# Patient Record
Sex: FEMALE | Race: WHITE | NOT HISPANIC OR LATINO | Employment: FULL TIME | URBAN - METROPOLITAN AREA
[De-identification: names, ages, dates, MRNs, and addresses within clinical notes are randomized per-mention and may not be internally consistent; named-entity substitution may affect disease eponyms.]

---

## 2017-01-02 ENCOUNTER — APPOINTMENT (OUTPATIENT)
Dept: RADIOLOGY | Facility: MEDICAL CENTER | Age: 72
End: 2017-01-02
Attending: EMERGENCY MEDICINE
Payer: COMMERCIAL

## 2017-01-02 ENCOUNTER — HOSPITAL ENCOUNTER (EMERGENCY)
Facility: MEDICAL CENTER | Age: 72
End: 2017-01-02
Attending: EMERGENCY MEDICINE
Payer: COMMERCIAL

## 2017-01-02 VITALS
BODY MASS INDEX: 27.95 KG/M2 | TEMPERATURE: 98.2 F | RESPIRATION RATE: 18 BRPM | HEART RATE: 50 BPM | HEIGHT: 62 IN | OXYGEN SATURATION: 94 % | WEIGHT: 151.9 LBS | DIASTOLIC BLOOD PRESSURE: 77 MMHG | SYSTOLIC BLOOD PRESSURE: 154 MMHG

## 2017-01-02 DIAGNOSIS — M62.838 MUSCLE SPASMS OF NECK: ICD-10-CM

## 2017-01-02 PROCEDURE — 70450 CT HEAD/BRAIN W/O DYE: CPT

## 2017-01-02 PROCEDURE — 96374 THER/PROPH/DIAG INJ IV PUSH: CPT

## 2017-01-02 PROCEDURE — 700111 HCHG RX REV CODE 636 W/ 250 OVERRIDE (IP): Performed by: EMERGENCY MEDICINE

## 2017-01-02 PROCEDURE — 96361 HYDRATE IV INFUSION ADD-ON: CPT

## 2017-01-02 PROCEDURE — 700105 HCHG RX REV CODE 258: Performed by: EMERGENCY MEDICINE

## 2017-01-02 PROCEDURE — 99284 EMERGENCY DEPT VISIT MOD MDM: CPT

## 2017-01-02 PROCEDURE — 96375 TX/PRO/DX INJ NEW DRUG ADDON: CPT

## 2017-01-02 PROCEDURE — 72125 CT NECK SPINE W/O DYE: CPT

## 2017-01-02 PROCEDURE — 36415 COLL VENOUS BLD VENIPUNCTURE: CPT

## 2017-01-02 RX ORDER — SODIUM CHLORIDE 9 MG/ML
INJECTION, SOLUTION INTRAVENOUS CONTINUOUS
Status: DISCONTINUED | OUTPATIENT
Start: 2017-01-02 | End: 2017-01-02 | Stop reason: HOSPADM

## 2017-01-02 RX ORDER — LORAZEPAM 0.5 MG/1
0.5 TABLET ORAL EVERY 8 HOURS PRN
Qty: 20 TAB | Refills: 0 | Status: SHIPPED | OUTPATIENT
Start: 2017-01-02 | End: 2017-07-21

## 2017-01-02 RX ORDER — MORPHINE SULFATE 4 MG/ML
4 INJECTION, SOLUTION INTRAMUSCULAR; INTRAVENOUS ONCE
Status: COMPLETED | OUTPATIENT
Start: 2017-01-02 | End: 2017-01-02

## 2017-01-02 RX ORDER — KETOROLAC TROMETHAMINE 30 MG/ML
30 INJECTION, SOLUTION INTRAMUSCULAR; INTRAVENOUS ONCE
Status: COMPLETED | OUTPATIENT
Start: 2017-01-02 | End: 2017-01-02

## 2017-01-02 RX ORDER — OXYCODONE HYDROCHLORIDE AND ACETAMINOPHEN 5; 325 MG/1; MG/1
1 TABLET ORAL EVERY 6 HOURS PRN
Qty: 20 TAB | Refills: 0 | Status: SHIPPED | OUTPATIENT
Start: 2017-01-02 | End: 2017-07-21

## 2017-01-02 RX ORDER — ONDANSETRON 2 MG/ML
4 INJECTION INTRAMUSCULAR; INTRAVENOUS ONCE
Status: COMPLETED | OUTPATIENT
Start: 2017-01-02 | End: 2017-01-02

## 2017-01-02 RX ADMIN — ONDANSETRON HYDROCHLORIDE 4 MG: 2 INJECTION, SOLUTION INTRAMUSCULAR; INTRAVENOUS at 10:52

## 2017-01-02 RX ADMIN — SODIUM CHLORIDE 1000 ML: 9 INJECTION, SOLUTION INTRAVENOUS at 10:51

## 2017-01-02 RX ADMIN — MORPHINE SULFATE 4 MG: 4 INJECTION INTRAVENOUS at 10:53

## 2017-01-02 RX ADMIN — KETOROLAC TROMETHAMINE 30 MG: 30 INJECTION, SOLUTION INTRAMUSCULAR; INTRAVENOUS at 10:53

## 2017-01-02 ASSESSMENT — PAIN SCALES - GENERAL: PAINLEVEL_OUTOF10: ASSUMED PAIN PRESENT

## 2017-01-02 NOTE — ED AVS SNAPSHOT
After Visit Summary                                                                                                                Alexsandra Woods   MRN: [id number]    Department:  [facility], Emergency Dept   Date of Visit:  1/2/2017            [facility], Emergency Dept    [address]    [place] [address]    Phone:  [phone]      You were seen by     Sudheer Eaton M.D.      Your Diagnosis Was     Muscle spasms of neck     M62.838       These are the medications you received during your hospitalization from 01/02/2017 0946 to 01/02/2017 1152     Date/Time Order Dose Route Action    01/02/2017 1051 NS infusion 1,000 mL Intravenous New Bag    01/02/2017 1053 ketorolac (TORADOL) injection 30 mg 30 mg Intravenous Given    01/02/2017 1053 morphine (pf) 4 mg/ml injection 4 mg 4 mg Intravenous Given    01/02/2017 1052 ondansetron (ZOFRAN) syringe/vial injection 4 mg 4 mg Intravenous Given      Follow-up Information     1. Call Axel Elam M.D..    Specialty:  Family Medicine    Why:  call the office tomorrow morning and arrange follow-up and recheck during the week    Contact information    [address] [address]  [phone]        Medication Information     Review all of your home medications and newly ordered medications with your primary doctor and/or pharmacist as soon as possible. Follow medication instructions as directed by your doctor and/or pharmacist.     Please keep your complete medication list with you and share with your physician. Update the information when medications are discontinued, doses are changed, or new medications (including over-the-counter products) are added; and carry medication information at all times in the event of emergency situations.               Medication List      START taking these medications        Instructions    lorazepam 0.5 MG Tabs   Commonly known as:  ATIVAN    Take 1 Tab by mouth every 8  hours as needed (for muscle spasm).   Dose:  0.5 mg       oxycodone-acetaminophen 5-325 MG Tabs   Commonly known as:  PERCOCET    Take 1 Tab by mouth every 6 hours as needed for Moderate Pain.   Dose:  1 Tab         ASK your doctor about these medications        Instructions    atenolol 25 MG Tabs   Commonly known as:  TENORMIN    Take 25 mg by mouth every day.   Dose:  25 mg       ergocalciferol 74473 UNIT capsule   Commonly known as:  DRISDOL    Take 1 Cap by mouth every day. Calcium 3007-4045 mg per day with this daily, until gone, then Vitamin D 1,000 to 2,000 IU per day.   Dose:  77205 Units       levothyroxine 112 MCG Tabs   Commonly known as:  SYNTHROID    Take 1 Tab by mouth every day.   Dose:  112 mcg       ondansetron 4 MG Tbdp   Commonly known as:  ZOFRAN ODT    Take 1 Tab by mouth every 8 hours as needed for Nausea/Vomiting.   Dose:  4 mg       triamterene/hctz 37.5-25 MG Caps   Commonly known as:  MAXZIDE-25/DYAZIDE    Take 1 Cap by mouth every morning.   Dose:  1 Cap               Procedures and tests performed during your visit     CT-CSPINE WITHOUT PLUS RECONS    CT-HEAD W/O    IV Saline Lock        Discharge Instructions       Rest at home. If pain is out of control or you have new or worsening symptoms return for recheck. Otherwise call your primary care doctor in the morning and arrange office recheck during the week.          Patient Information     Patient Information    Following emergency treatment: all patient requiring follow-up care must return either to a private physician or a clinic if your condition worsens before you are able to obtain further medical attention, please return to the emergency room.     Billing Information    At [facility], we work to make the billing process streamlined for our patients.  Our Representatives are here to answer any questions you may have regarding your hospital bill.  If you have insurance coverage and have supplied your insurance information to us,  we will submit a claim to your insurer on your behalf.  Should you have any questions regarding your bill, we can be reached online or by phone as follows:  Online: You are able pay your bills online or live chat with our representatives about any billing questions you may have. We are here to help Monday - Friday from 8:00am to 7:30pm and 9:00am - 12:00pm on Saturdays.  Please visit [email or web address]  for more information.   Phone:  [phone] or [phone]    Please note that your emergency physician, surgeon, pathologist, radiologist, anesthesiologist, and other specialists are not employed by [facility] and will therefore bill separately for their services.  Please contact them directly for any questions concerning their bills at the numbers below:     Emergency Physician Services:  [phone]  [facility]:  [phone]  [facility]:  [phone]  [facility]:  [phone]    1. Your final bill may vary from the amount quoted upon discharge if all procedures are not complete at that time, or if your doctor has additional procedures of which we are not aware. You will receive an additional bill if you return to the Emergency Department at [facility] for suture removal regardless of the facility of which the sutures were placed.     2. Please arrange for settlement of this account at the emergency registration.    3. All self-pay accounts are due in full at the time of treatment.  If you are unable to meet this obligation then payment is expected within 4-5 days.     4. If you have had radiology studies (CT, X-ray, Ultrasound, MRI), you have received a preliminary result during your emergency department visit. Please contact the radiology department [phone] to receive a copy of your final result. Please discuss the Final result with your primary physician or with the follow up physician provided.     Crisis  Hotline:  National Crisis Hotline:  6-482-XBYLPCD or 1-305.539.4504  [place] Crisis Hotline:    1-894.911.9873 or 462-676-1162         ED Discharge Follow Up Questions    1. In order to provide you with very good care, we would like to follow up with a phone call in the next few days.  May we have your permission to contact you?     YES /  NO    2. What is the best phone number to call you? (       )_____-__________    3. What is the best time to call you?      Morning  /  Afternoon  /  Evening                   Patient Signature:  ____________________________________________________________    Date:  ____________________________________________________________

## 2017-01-02 NOTE — ED AVS SNAPSHOT
ApnaPaisa Access Code: Activation code not generated  Current ApnaPaisa Status: Active    [employer]  A secure, online tool to manage your health information     [employer]s ApnaPaisa® is a secure, online tool that connects you to your personalized health information from the privacy of your home -- day or night - making it very easy for you to manage your healthcare. Once the activation process is completed, you can even access your medical information using the ApnaPaisa debra, which is available for free in the Apple Debra store or Google Play store.     ApnaPaisa provides the following levels of access (as shown below):   My Chart Features   [facility] Primary Care Doctor [facility]  Specialists [facility]  Urgent  Care Non-[facility]  Primary Care  Doctor   Email your healthcare team securely and privately 24/7 X X X X   Manage appointments: schedule your next appointment; view details of past/upcoming appointments X      Request prescription refills. X      View recent personal medical records, including lab and immunizations X X X X   View health record, including health history, allergies, medications X X X X   Read reports about your outpatient visits, procedures, consult and ER notes X X X X   See your discharge summary, which is a recap of your hospital and/or ER visit that includes your diagnosis, lab results, and care plan. X X       How to register for ApnaPaisa:  1. Go to  [email or web address].  2. Click on the Sign Up Now box, which takes you to the New Member Sign Up page. You will need to provide the following information:  a. Enter your ApnaPaisa Access Code exactly as it appears at the top of this page. (You will not need to use this code after you’ve completed the sign-up process. If you do not sign up before the expiration date, you must request a new code.)   b. Enter your date of birth.   c. Enter your home email address.   d. Click Submit, and follow the next screen’s instructions.  3. Create a ApnaPaisa ID. This will  be your [employer] login ID and cannot be changed, so think of one that is secure and easy to remember.  4. Create a [employer] password. You can change your password at any time.  5. Enter your Password Reset Question and Answer. This can be used at a later time if you forget your password.   6. Enter your e-mail address. This allows you to receive e-mail notifications when new information is available in [employer].  7. Click Sign Up. You can now view your health information.    For assistance activating your FoodText account, call [phone]

## 2017-01-02 NOTE — ED NOTES
PATIENT VERY UPSET, CRYING HAD TO PAY HER COPAY AND SHE NOW CAN'T PAY FOR HER DAUGHTER TO FLY HOME. THE FAMILY REQUESTS THEY MAKE PAYMENTS FOR HER COPAYMENT.

## 2017-01-02 NOTE — DISCHARGE INSTRUCTIONS
Rest at home. If pain is out of control or you have new or worsening symptoms return for recheck. Otherwise call your primary care doctor in the morning and arrange office recheck during the week.

## 2017-01-02 NOTE — ED AVS SNAPSHOT
1/2/2017          Alexsandra Woods  [address]  [place] [address]    Dear Alexsandra:    [facility] wants to ensure your discharge home is safe and you or your loved ones have had all your questions answered regarding your care after you leave the hospital.    You may receive a telephone call within two days of your discharge.  This call is to make certain you understand your discharge instructions as well as ensure we provided you with the best care possible during your stay with us.     The call will only last approximately 3-5 minutes and will be done by a nurse.    Once again, we want to ensure your discharge home is safe and that you have a clear understanding of any next steps in your care.  If you have any questions or concerns, please do not hesitate to contact us, we are here for you.  Thank you for choosing [facility] for your healthcare needs.    Sincerely,    Zander Kohli    [facility]

## 2017-01-02 NOTE — ED NOTES
NO injury known. Patient was at a movie on Saturday, yesterday neck was sore. The pain this morning when she woke up was severe. Has placed a towel for support. No weakness, or loss of sensation in arms or legs.

## 2017-01-02 NOTE — ED PROVIDER NOTES
ED Provider Note    CHIEF COMPLAINT  Chief Complaint   Patient presents with   • Stiff Neck       HPI  Alexsandra Woods is a 71 y.o. female who presents to the emergency department complaining that she woke up with a painful stiff neck this morning. The patient and her  went to the movie theater last night and she felt completely well until this morning when she woke up and says that her neck was painful and very stiff. Pain is radiating up the back of the neck. The patient tried some home remedies including massage and a homemade neck brace and this was not all that helpful so she's come to the emergency department for evaluation.    REVIEW OF SYSTEMS the patient has not had any recent trauma and has not been ill. No cough no runny nose no sore throat no fever or chills no nausea vomiting or diarrhea. No numbness or tingling or weakness of the extremities. All other systems negative    PAST MEDICAL HISTORY  Past Medical History   Diagnosis Date   • Breast cancer (HCC)      right, chemotherapy   • Cancer (HCC) 2003     breast RIGHT SIDE   • Hypothyroid    • HTN (hypertension)    • Shingles    • Allergic rhinitis due to pollen    • Anemia      from chemo   • Tinnitus    • Rotator cuff disorder        FAMILY HISTORY  Family History   Problem Relation Age of Onset   • Stroke Mother 78   • Cancer Mother      Cervical   • Lung Disease Mother    • Hypertension Father    • Cancer Maternal Grandfather      Lung       SOCIAL HISTORY  Social History     Social History   • Marital Status:      Spouse Name: N/A   • Number of Children: N/A   • Years of Education: N/A     Occupational History   •  [employer]     Social History Main Topics   • Smoking status: Never Smoker    • Smokeless tobacco: Never Used   • Alcohol Use: 1.0 oz/week     2 Glasses of wine per week      Comment: occ wine   • Drug Use: No   • Sexual Activity:     Partners: Male     Other Topics Concern   • None     Social History  "Narrative     with 2 children.       SURGICAL HISTORY  Past Surgical History   Procedure Laterality Date   • Cyst excision     • Mastectomy  2003     bilateral, excision lymph nodes right axillary   • Lymph node excision       right arm   • Rotator cuff repair  2005     left   • hg cath, port-a-cath  2003   • Knee arthroscopy  2005     right   • Rotator cuff repair  2/5/2009     Performed by OSGOOD, PATRICK J at SURGERY [facility]   • Shoulder decompression  2/5/2009     Performed by OSGOOD, PATRICK J at SURGERY [facility]   • Laminotomy  11/18/2009     Performed by SARITA BURKS at SURGERY [facility]       CURRENT MEDICATIONS  Home Medications     **Home medications have not yet been reviewed for this encounter**          ALLERGIES  Allergies   Allergen Reactions   • Nkda [No Known Drug Allergy]        PHYSICAL EXAM  VITAL SIGNS: /77 mmHg  Pulse 50  Temp(Src) 36.8 °C (98.2 °F)  Resp 18  Ht 1.575 m (5' 2\")  Wt 68.9 kg (151 lb 14.4 oz)  BMI 27.78 kg/m2  SpO2 94%   Oxygen saturation is interpreted as adequate  Constitutional: Awake and very anxious and uncomfortable  HENT: No sign of trauma to the head  Eyes: Pupils round extraocular motion present  Neck: Trachea midline no JVD patient's neck musculature is in spasm throughout the neck. I don't see any vesicular lesions or rashes  Cardiovascular: Regular rate and rhythm  Lungs: Clear and equal bilaterally with no apparent difficulty breathing  Abdomen/Back: No thoracic or lumbar tenderness  Skin: Warm and dry  Musculoskeletal: No acute bony deformity  Neurologic: Awake and verbal and moving her extremities without difficulty    Radiology  CT-CSPINE WITHOUT PLUS RECONS   Final Result      1.  There is no acute fracture of the cervical spine.   2.  There is extensive multilevel degenerative change throughout the cervical spine with variable degrees of bilateral neural foraminal narrowing but no central canal stenosis.       "   CT-HEAD W/O   Final Result      1.  White matter lucencies most consistent with small vessel ischemic change versus demyelination or gliosis.      1.  2.  Otherwise, Head CT without contrast with no acute findings. No evidence of acute cerebral infarction, hemorrhage or mass lesion.        MEDICAL DECISION MAKING and DISPOSITION  In the emergency department IV was established the patient was given intravenous Toradol and morphine and Zofran. This has been very very helpful the patient is feeling much better and is able to move her neck without pain. I have reviewed all the findings with the patient and her  and I think that most likely this was muscle spasm of the neck. There is no history of recent trauma or illness. At this point in time I think it is safe for the patient to go home and I have written prescriptions for Percocet for pain and low dose Ativan for muscle spasm. The patient is instructed to call her primary care doctor in the morning and arrange office recheck during the week and she may return here if pain is out of control or she has new or worsening symptoms    IMPRESSION  1. Muscle spasms of neck         Electronically signed by: Sudheer Eaton, 1/2/2017 11:43 AM

## 2017-02-17 ENCOUNTER — HOSPITAL ENCOUNTER (OUTPATIENT)
Dept: LAB | Facility: MEDICAL CENTER | Age: 72
End: 2017-02-17
Attending: FAMILY MEDICINE
Payer: COMMERCIAL

## 2017-02-17 LAB — TSH SERPL DL<=0.005 MIU/L-ACNC: 2.94 UIU/ML (ref 0.3–3.7)

## 2017-02-17 PROCEDURE — 36415 COLL VENOUS BLD VENIPUNCTURE: CPT

## 2017-02-17 PROCEDURE — 84443 ASSAY THYROID STIM HORMONE: CPT

## 2017-06-19 ENCOUNTER — HOSPITAL ENCOUNTER (OUTPATIENT)
Facility: MEDICAL CENTER | Age: 72
End: 2017-06-19
Attending: ORTHOPAEDIC SURGERY | Admitting: ORTHOPAEDIC SURGERY
Payer: COMMERCIAL

## 2017-07-21 VITALS — BODY MASS INDEX: 27.79 KG/M2 | HEIGHT: 62 IN | WEIGHT: 151.01 LBS

## 2017-07-21 DIAGNOSIS — Z01.812 PRE-OPERATIVE LABORATORY EXAMINATION: ICD-10-CM

## 2017-07-21 DIAGNOSIS — Z01.810 PRE-OPERATIVE CARDIOVASCULAR EXAMINATION: ICD-10-CM

## 2017-07-21 LAB
ANION GAP SERPL CALC-SCNC: 8 MMOL/L (ref 0–11.9)
BUN SERPL-MCNC: 26 MG/DL (ref 8–22)
CALCIUM SERPL-MCNC: 10.2 MG/DL (ref 8.5–10.5)
CHLORIDE SERPL-SCNC: 107 MMOL/L (ref 96–112)
CO2 SERPL-SCNC: 29 MMOL/L (ref 20–33)
CREAT SERPL-MCNC: 1.07 MG/DL (ref 0.5–1.4)
EKG IMPRESSION: NORMAL
ERYTHROCYTE [DISTWIDTH] IN BLOOD BY AUTOMATED COUNT: 43.2 FL (ref 35.9–50)
GFR SERPL CREATININE-BSD FRML MDRD: 50 ML/MIN/1.73 M 2
GLUCOSE SERPL-MCNC: 96 MG/DL (ref 65–99)
HCT VFR BLD AUTO: 42.9 % (ref 37–47)
HGB BLD-MCNC: 14.5 G/DL (ref 12–16)
MCH RBC QN AUTO: 30.1 PG (ref 27–33)
MCHC RBC AUTO-ENTMCNC: 33.8 G/DL (ref 33.6–35)
MCV RBC AUTO: 89.2 FL (ref 81.4–97.8)
PLATELET # BLD AUTO: 182 K/UL (ref 164–446)
PMV BLD AUTO: 11.7 FL (ref 9–12.9)
POTASSIUM SERPL-SCNC: 3.4 MMOL/L (ref 3.6–5.5)
RBC # BLD AUTO: 4.81 M/UL (ref 4.2–5.4)
SODIUM SERPL-SCNC: 144 MMOL/L (ref 135–145)
WBC # BLD AUTO: 5.7 K/UL (ref 4.8–10.8)

## 2017-07-21 PROCEDURE — 93010 ELECTROCARDIOGRAM REPORT: CPT | Performed by: INTERNAL MEDICINE

## 2017-07-21 PROCEDURE — 36415 COLL VENOUS BLD VENIPUNCTURE: CPT

## 2017-07-21 PROCEDURE — 85027 COMPLETE CBC AUTOMATED: CPT

## 2017-07-21 PROCEDURE — 93005 ELECTROCARDIOGRAM TRACING: CPT

## 2017-07-21 PROCEDURE — 80048 BASIC METABOLIC PNL TOTAL CA: CPT

## 2017-07-21 RX ORDER — LEVOTHYROXINE SODIUM 0.07 MG/1
75 TABLET ORAL
COMMUNITY
End: 2018-06-13

## 2017-07-21 NOTE — OR NURSING
Pre admit appt: Pt instructed to continue regularly prescribed medications through day before surgery.Per anesthesia protocol pt instructed to take these medications with a sip of water the day of surgery- atenolol and synthroid.

## 2017-08-15 ENCOUNTER — OFFICE VISIT (OUTPATIENT)
Dept: CARDIOLOGY | Facility: MEDICAL CENTER | Age: 72
End: 2017-08-15
Payer: COMMERCIAL

## 2017-08-15 VITALS
DIASTOLIC BLOOD PRESSURE: 78 MMHG | BODY MASS INDEX: 27.82 KG/M2 | WEIGHT: 151.2 LBS | HEART RATE: 52 BPM | HEIGHT: 62 IN | SYSTOLIC BLOOD PRESSURE: 140 MMHG | OXYGEN SATURATION: 95 %

## 2017-08-15 DIAGNOSIS — R00.1 BRADYCARDIA: ICD-10-CM

## 2017-08-15 DIAGNOSIS — I10 ESSENTIAL HYPERTENSION: ICD-10-CM

## 2017-08-15 LAB — EKG IMPRESSION: NORMAL

## 2017-08-15 PROCEDURE — 99204 OFFICE O/P NEW MOD 45 MIN: CPT | Performed by: INTERNAL MEDICINE

## 2017-08-15 RX ORDER — LISINOPRIL 5 MG/1
5 TABLET ORAL DAILY
Qty: 30 TAB | Refills: 11 | Status: SHIPPED | OUTPATIENT
Start: 2017-08-15 | End: 2017-09-13 | Stop reason: SDUPTHER

## 2017-08-15 RX ORDER — LISINOPRIL 10 MG/1
5 TABLET ORAL DAILY
Qty: 30 TAB | Refills: 11 | Status: SHIPPED | OUTPATIENT
Start: 2017-08-15 | End: 2017-08-15 | Stop reason: SDUPTHER

## 2017-08-15 ASSESSMENT — ENCOUNTER SYMPTOMS
LOSS OF CONSCIOUSNESS: 0
ABDOMINAL PAIN: 0
SHORTNESS OF BREATH: 0
ORTHOPNEA: 0
DIZZINESS: 0
BRUISES/BLEEDS EASILY: 0
PND: 0
PALPITATIONS: 0
FALLS: 0
DEPRESSION: 0

## 2017-08-15 NOTE — PATIENT INSTRUCTIONS
Please start lisinopril.   Please STOP ATENOLOL. DO NOT restart without talking to our clinic.   Please get a non-fasting blood test in about 7-10 days.   You will get an EKG monitor.

## 2017-08-15 NOTE — Clinical Note
[facility]   [address], [facility] [address]  [place] [address]  Phone: [phone]  Fax: [phone]              Alexsandra Woods  1945    Encounter Date: 8/15/2017    Elizabeth Watts M.D.          PROGRESS NOTE:  Subjective:   Alexsandra Woods is a 72 y.o. female with past medical history significant for hypertension who was referred to cardiology clinic for perioperative risk assessment prior to planned carpal tunnel surgery. Patient underwent a preop ECG which showed sinus bradycardia. Patient was advised to stop the atenolol. After stopping the atenolol her heart rate improved to 60s to 70s. However patient got concerned as she felt her heart beating while she was off of the atenolol. She subsequently resume the atenolol 2 days ago and since then her heart rate has been consistently in the low 50s. Blood pressure after stopping atenolol ranged from 130s to 150s systolic.    She denies any history of presyncope or syncopal episodes. However our consult request states history of dizziness with postural change. No chest discomfort or dyspnea.    She works at [employer] in the [employer]. Reports having a very stressful job. She has never checked her blood pressure at work. She reports that she walks about 5 miles while she is at work. Denies any regular exercise.    She has a history of breast cancer in 2003 status post chemotherapy. Patient does not know about her chemotherapy regimen. No history of radiation. Underwent a double mastectomy at that time.     Past Medical History   Diagnosis Date   • Breast cancer (CMS-HCC)      right, chemotherapy   • Cancer (CMS-HCC) 2003     breast RIGHT SIDE   • Hypothyroid    • HTN (hypertension)    • Shingles    • Allergic rhinitis due to pollen    • Anemia      from chemo   • Tinnitus    • Rotator cuff disorder    • High cholesterol    • Cataract      Fausto IOL     Past Surgical History   Procedure Laterality Date      • Cyst excision     • Mastectomy Bilateral 2003      excision lymph nodes right axillary   • Lymph node excision Right      arm   • Rotator cuff repair Right 2005   • [facility] cath, port-a-cath  2003   • Knee arthroscopy Right 2005   • Rotator cuff repair  2/5/2009     Performed by OSGOOD, PATRICK J at SURGERY [facility] ORS   • Shoulder decompression  2/5/2009     Performed by OSGOOD, PATRICK J at SURGERY [facility] ORS   • Laminotomy  11/18/2009     LEFT Performed by SARITA BURKS at SURGERY [facility]   • Cataract extraction with iol Bilateral 7/2015   • Other surgical procedure       9/2003 port placed, 3/2004 port removed   • Breast reconstruction  6/2004   • Breast implant removal       expanders removed     Family History   Problem Relation Age of Onset   • Stroke Mother 78   • Cancer Mother      Cervical   • Lung Disease Mother    • Hypertension Father    • Cancer Maternal Grandfather      Lung     History   Smoking status   • Never Smoker    Smokeless tobacco   • Never Used     Occasional alcohol. No recreational drug use.    Allergies   Allergen Reactions   • Nkda [No Known Drug Allergy]      Outpatient Encounter Prescriptions as of 8/15/2017   Medication Sig Dispense Refill   • lisinopril (PRINIVIL) 10 MG Tab Take 0.5 Tabs by mouth every day. 30 Tab 11   • levothyroxine (SYNTHROID) 75 MCG Tab Take 75 mcg by mouth Every morning on an empty stomach.     • ascorbic acid (VITAMIN C) 100 MG tablet Take 1,000 mg by mouth every day.     • cholecalciferol (VITAMIN D3) 5000 UNIT Cap Take 5,000 Units by mouth every day.     • B Complex-C (SUPER B COMPLEX PO) Take 1 Tab by mouth every day.     • Glucosamine HCl (GLUCOSAMINE PO) Take 1 Tab by mouth every day.     • multivitamin (THERAGRAN) Tab Take 1 Tab by mouth every day.     • triamterene/hctz (MAXZIDE-25/DYAZIDE) 37.5-25 MG CAPS Take 1 Cap by mouth every morning. 30 Cap 0   • [DISCONTINUED] atenolol (TENORMIN) 25 MG TABS Take 25 mg by mouth every day.    "    No facility-administered encounter medications on file as of 8/15/2017.     Review of Systems   Constitutional: Negative for malaise/fatigue.   HENT: Negative.    Respiratory: Negative for shortness of breath.    Cardiovascular: Negative for chest pain, palpitations, orthopnea, leg swelling and PND.   Gastrointestinal: Negative for abdominal pain.   Musculoskeletal: Negative for falls.   Skin: Negative.    Neurological: Negative for dizziness and loss of consciousness.   Endo/Heme/Allergies: Does not bruise/bleed easily.   Psychiatric/Behavioral: Negative for depression.   All other systems reviewed and are negative.       Objective:   /78 mmHg  Pulse 52  Ht 1.575 m (5' 2.01\")  Wt 68.584 kg (151 lb 3.2 oz)  BMI 27.65 kg/m2  SpO2 95%    Physical Exam   Constitutional: She is oriented to person, place, and time. No distress.   HENT:   Head: Normocephalic and atraumatic.   Eyes: Conjunctivae are normal.   Neck: Normal range of motion. Neck supple. No JVD present.   Cardiovascular: Normal rate, regular rhythm and normal heart sounds.  Exam reveals no gallop and no friction rub.    No murmur heard.  Pulmonary/Chest: Effort normal and breath sounds normal. No respiratory distress. She has no wheezes. She has no rales.   Abdominal: Soft. There is no tenderness.   Musculoskeletal: She exhibits no edema.   Neurological: She is alert and oriented to person, place, and time.   Skin: Skin is warm and dry. She is not diaphoretic.   Psychiatric: She has a normal mood and affect.   Nursing note and vitals reviewed.    EKG from today was reviewed and shows sinus bradycardia with sinus arrhythmia in the 40s beats per minute, high voltages noted however does not meet criteria for LVH.    Labs from July 2017 were reviewed. Normal hemoglobin. Creatinine normal. Potassium 3.4.    Assessment:     1. Essential hypertension  EKG    lisinopril (PRINIVIL) 10 MG Tab    BASIC METABOLIC PANEL   2. Bradycardia  HOLTER MONITOR " STUDY       Medical Decision Making:  Today's Assessment / Status / Plan:     Sinus bradycardia-likely in the setting of atenolol use. Patient brings today a log of her blood pressures which show that her heart rate decreased after she resumed the atenolol. I have strongly advised the patient to quit taking atenolol at this time. I will obtain a Holter monitor to evaluate her heart rate after stopping the atenolol.    Hypertension-blood pressure for her age should ideally be about 130s to 140s systolic. Continue triamterene/HCTZ at current dose. Atenolol stopped as above. Instead I will start her on lisinopril 5 mg once a day. Her Chem-7 shows mild hypokalemia which should improve after starting lisinopril. I will obtain a Chem-7 in about 7-10 days after starting lisinopril. She should consider checking her blood pressure at work as well.    I advised the patient to start low level aerobic activity and increase as tolerated.    Perioperative risk assessment-I will reevaluate the patient in one month for this.    Return to clinic in 1 month or earlier if needed.    Thank you for allowing me to participate in the care of this patient. Please do not hesitate to contact me with any questions.    Elizabeth Watts MD  Cardiologist  [facility]      PLEASE NOTE: This dictation was created using voice recognition software.         Axel Elam M.D.  [address] [place] [id number]  VIA Facsimile: [phone]

## 2017-08-15 NOTE — MR AVS SNAPSHOT
"Alexsandra Woods   8/15/2017 8:20 AM   Office Visit   MRN: [id number]    Department:  [facility]   Dept Phone:  [phone]    Description:  Female : 1945   Provider:  Elizabeth Watts M.D.           Reason for Visit     Follow-Up SURGICAL CLEARANCE      Allergies as of 8/15/2017     Allergen Noted Reactions    Nkda [No Known Drug Allergy] 2008         You were diagnosed with     Essential hypertension   [4259551]       Bradycardia   [569744]         Vital Signs     Blood Pressure Pulse Height Weight Body Mass Index Oxygen Saturation    140/78 mmHg 52 1.575 m (5' 2.01\") 68.584 kg (151 lb 3.2 oz) 27.65 kg/m2 95%    Smoking Status                   Never Smoker            Basic Information     Date Of Birth Sex Race Ethnicity Preferred Language    1945 Female White Non- English      Your appointments     Aug 28, 2017 10:00 AM   HOLTER MONITOR 48 HRS with H[facility]TER[facility]   [facility] (--)    [address], [address]  [place] [id number] [phone]            Sep 13, 2017  8:00 AM   FOLLOW UP with Elizabeth Watts M.D.   [facility] (--)    [address], [address]  [place] [id number] [phone]              Problem List              ICD-10-CM Priority Class Noted - Resolved    Breast cancer (CMS-HCC) C50.919   3/15/2010 - Present    Essential hypertension I10   3/15/2010 - Present    Routine health maintenance Z00.00   3/15/2010 - Present    Hypothyroidism (acquired) E03.9   2011 - Present    Shingles B02.9   2011 - Present    S/P colonoscopy Z98.890   12/15/2011 - Present    Allergic rhinitis due to pollen J30.1   Unknown - Present    Anemia D64.9   Unknown - Present    Tinnitus H93.19   Unknown - Present    Vitamin d deficiency    12/15/2011 - Present    Trigger thumb M65.319   12/15/2011 - Present    History of breast cancer Z85.3   2012 - Present      Health Maintenance        Date " Due Completion Dates    IMM DTaP/Tdap/Td Vaccine (1 - Tdap) 6/15/1962 ---    PAP SMEAR 6/15/1964 ---    COLONOSCOPY 6/15/1993 ---    IMM ZOSTER VACCINE 6/15/2003 ---    MAMMOGRAM 4/2/2008 4/2/2007    IMM PNEUMOCOCCAL 65+ (ADULT) LOW/MEDIUM RISK SERIES (2 of 2 - PCV13) 11/3/2012 11/3/2011, 3/15/2010    BONE DENSITY 5/20/2015 5/20/2010, 5/12/2009, 9/14/2007    IMM INFLUENZA (1) 9/1/2017 ---            Results       Current Immunizations     Pneumococcal polysaccharide vaccine (PPSV-23) 11/3/2011, 3/15/2010  2:31 PM      Below and/or attached are the medications your provider expects you to take. Review all of your home medications and newly ordered medications with your provider and/or pharmacist. Follow medication instructions as directed by your provider and/or pharmacist. Please keep your medication list with you and share with your provider. Update the information when medications are discontinued, doses are changed, or new medications (including over-the-counter products) are added; and carry medication information at all times in the event of emergency situations     Allergies:  NKDA - (reactions not documented)               Medications  Valid as of: August 15, 2017 -  9:20 AM    Generic Name Brand Name Tablet Size Instructions for use    Ascorbic Acid (Tab) VITAMIN C 100 MG Take 1,000 mg by mouth every day.        B Complex-C   Take 1 Tab by mouth every day.        Cholecalciferol (Cap) VITAMIN D3 5000 UNIT Take 5,000 Units by mouth every day.        Glucosamine HCl   Take 1 Tab by mouth every day.        Levothyroxine Sodium (Tab) SYNTHROID 75 MCG Take 75 mcg by mouth Every morning on an empty stomach.        Lisinopril (Tab) PRINIVIL 5 MG Take 1 Tab by mouth every day.        Multiple Vitamin (Tab) THERAGRAN  Take 1 Tab by mouth every day.        Triamterene-HCTZ (Cap) MAXZIDE-25/DYAZIDE 37.5-25 MG Take 1 Cap by mouth every morning.        .                 Medicines prescribed today were sent to:      [facility]Y #[id number] - [place], [place] - [address]    [address] [place] [address]    Phone: [phone] Fax: [phone]    Open 24 Hours?: No      Medication refill instructions:       If your prescription bottle indicates you have medication refills left, it is not necessary to call your provider’s office. Please contact your pharmacy and they will refill your medication.    If your prescription bottle indicates you do not have any refills left, you may request refills at any time through one of the following ways: The online [employer] system (except Urgent Care), by calling your provider’s office, or by asking your pharmacy to contact your provider’s office with a refill request. Medication refills are processed only during regular business hours and may not be available until the next business day. Your provider may request additional information or to have a follow-up visit with you prior to refilling your medication.   *Please Note: Medication refills are assigned a new Rx number when refilled electronically. Your pharmacy may indicate that no refills were authorized even though a new prescription for the same medication is available at the pharmacy. Please request the medicine by name with the pharmacy before contacting your provider for a refill.        Your To Do List     Future Labs/Procedures Complete By Expires    BASIC METABOLIC PANEL  As directed 8/15/2018    HOLTER MONITOR STUDY  As directed 8/15/2018      Instructions    Please start lisinopril.   Please STOP ATENOLOL. DO NOT restart without talking to our clinic.   Please get a non-fasting blood test in about 7-10 days.   You will get an EKG monitor.             [employer] Access Code: Activation code not generated  Current [employer] Status: Active

## 2017-08-15 NOTE — PROGRESS NOTES
Subjective:   Alexsandra Woods is a 72 y.o. female with past medical history significant for hypertension who was referred to cardiology clinic for perioperative risk assessment prior to planned carpal tunnel surgery. Patient underwent a preop ECG which showed sinus bradycardia. Patient was advised to stop the atenolol. After stopping the atenolol her heart rate improved to 60s to 70s. However patient got concerned as she felt her heart beating while she was off of the atenolol. She subsequently resume the atenolol 2 days ago and since then her heart rate has been consistently in the low 50s. Blood pressure after stopping atenolol ranged from 130s to 150s systolic.    She denies any history of presyncope or syncopal episodes. However our consult request states history of dizziness with postural change. No chest discomfort or dyspnea.    She works at [employer] in the [employer]. Reports having a very stressful job. She has never checked her blood pressure at work. She reports that she walks about 5 miles while she is at work. Denies any regular exercise.    She has a history of breast cancer in 2003 status post chemotherapy. Patient does not know about her chemotherapy regimen. No history of radiation. Underwent a double mastectomy at that time.     Past Medical History   Diagnosis Date   • Breast cancer (CMS-HCC)      right, chemotherapy   • Cancer (CMS-HCC) 2003     breast RIGHT SIDE   • Hypothyroid    • HTN (hypertension)    • Shingles    • Allergic rhinitis due to pollen    • Anemia      from chemo   • Tinnitus    • Rotator cuff disorder    • High cholesterol    • Cataract      Fausto IOL     Past Surgical History   Procedure Laterality Date   • Cyst excision     • Mastectomy Bilateral 2003      excision lymph nodes right axillary   • Lymph node excision Right      arm   • Rotator cuff repair Right 2005   • [facility] cath, port-a-cath  2003   • Knee arthroscopy Right 2005   • Rotator cuff repair  2/5/2009      Performed by OSGOOD, PATRICK J at SURGERY [facility] ORS   • Shoulder decompression  2/5/2009     Performed by OSGOOD, PATRICK J at SURGERY [facility] ORS   • Laminotomy  11/18/2009     LEFT Performed by SARITA BURKS at SURGERY [facility]   • Cataract extraction with iol Bilateral 7/2015   • Other surgical procedure       9/2003 port placed, 3/2004 port removed   • Breast reconstruction  6/2004   • Breast implant removal       expanders removed     Family History   Problem Relation Age of Onset   • Stroke Mother 78   • Cancer Mother      Cervical   • Lung Disease Mother    • Hypertension Father    • Cancer Maternal Grandfather      Lung     History   Smoking status   • Never Smoker    Smokeless tobacco   • Never Used     Occasional alcohol. No recreational drug use.    Allergies   Allergen Reactions   • Nkda [No Known Drug Allergy]      Outpatient Encounter Prescriptions as of 8/15/2017   Medication Sig Dispense Refill   • lisinopril (PRINIVIL) 10 MG Tab Take 0.5 Tabs by mouth every day. 30 Tab 11   • levothyroxine (SYNTHROID) 75 MCG Tab Take 75 mcg by mouth Every morning on an empty stomach.     • ascorbic acid (VITAMIN C) 100 MG tablet Take 1,000 mg by mouth every day.     • cholecalciferol (VITAMIN D3) 5000 UNIT Cap Take 5,000 Units by mouth every day.     • B Complex-C (SUPER B COMPLEX PO) Take 1 Tab by mouth every day.     • Glucosamine HCl (GLUCOSAMINE PO) Take 1 Tab by mouth every day.     • multivitamin (THERAGRAN) Tab Take 1 Tab by mouth every day.     • triamterene/hctz (MAXZIDE-25/DYAZIDE) 37.5-25 MG CAPS Take 1 Cap by mouth every morning. 30 Cap 0   • [DISCONTINUED] atenolol (TENORMIN) 25 MG TABS Take 25 mg by mouth every day.       No facility-administered encounter medications on file as of 8/15/2017.     Review of Systems   Constitutional: Negative for malaise/fatigue.   HENT: Negative.    Respiratory: Negative for shortness of breath.    Cardiovascular: Negative for chest pain,  "palpitations, orthopnea, leg swelling and PND.   Gastrointestinal: Negative for abdominal pain.   Musculoskeletal: Negative for falls.   Skin: Negative.    Neurological: Negative for dizziness and loss of consciousness.   Endo/Heme/Allergies: Does not bruise/bleed easily.   Psychiatric/Behavioral: Negative for depression.   All other systems reviewed and are negative.       Objective:   /78 mmHg  Pulse 52  Ht 1.575 m (5' 2.01\")  Wt 68.584 kg (151 lb 3.2 oz)  BMI 27.65 kg/m2  SpO2 95%    Physical Exam   Constitutional: She is oriented to person, place, and time. No distress.   HENT:   Head: Normocephalic and atraumatic.   Eyes: Conjunctivae are normal.   Neck: Normal range of motion. Neck supple. No JVD present.   Cardiovascular: Normal rate, regular rhythm and normal heart sounds.  Exam reveals no gallop and no friction rub.    No murmur heard.  Pulmonary/Chest: Effort normal and breath sounds normal. No respiratory distress. She has no wheezes. She has no rales.   Abdominal: Soft. There is no tenderness.   Musculoskeletal: She exhibits no edema.   Neurological: She is alert and oriented to person, place, and time.   Skin: Skin is warm and dry. She is not diaphoretic.   Psychiatric: She has a normal mood and affect.   Nursing note and vitals reviewed.    EKG from today was reviewed and shows sinus bradycardia with sinus arrhythmia in the 40s beats per minute, high voltages noted however does not meet criteria for LVH.    Labs from July 2017 were reviewed. Normal hemoglobin. Creatinine normal. Potassium 3.4.    Assessment:     1. Essential hypertension  EKG    lisinopril (PRINIVIL) 10 MG Tab    BASIC METABOLIC PANEL   2. Bradycardia  HOLTER MONITOR STUDY       Medical Decision Making:  Today's Assessment / Status / Plan:     Sinus bradycardia-likely in the setting of atenolol use. Patient brings today a log of her blood pressures which show that her heart rate decreased after she resumed the atenolol. I " have strongly advised the patient to quit taking atenolol at this time. I will obtain a Holter monitor to evaluate her heart rate after stopping the atenolol.    Hypertension-blood pressure for her age should ideally be about 130s to 140s systolic. Continue triamterene/HCTZ at current dose. Atenolol stopped as above. Instead I will start her on lisinopril 5 mg once a day. Her Chem-7 shows mild hypokalemia which should improve after starting lisinopril. I will obtain a Chem-7 in about 7-10 days after starting lisinopril. She should consider checking her blood pressure at work as well.    I advised the patient to start low level aerobic activity and increase as tolerated.    Perioperative risk assessment-I will reevaluate the patient in one month for this.    Return to clinic in 1 month or earlier if needed.    Thank you for allowing me to participate in the care of this patient. Please do not hesitate to contact me with any questions.    Elizabeth Watts MD  Cardiologist  [facility]      PLEASE NOTE: This dictation was created using voice recognition software.

## 2017-08-17 ENCOUNTER — TELEPHONE (OUTPATIENT)
Dept: CARDIOLOGY | Facility: MEDICAL CENTER | Age: 72
End: 2017-08-17

## 2017-08-17 NOTE — TELEPHONE ENCOUNTER
Per Office Visit 8/15/17 by MD EMMY Watts. Pt needs to be re-evaluated for her Josy-op risk assessment for Left open CTR surgery, clearance faxed back to [facility], Fax #[phone], Phone#[phone] with instructions to resend Clearance by then.     Maria VAZQUEZ

## 2017-08-28 ENCOUNTER — NON-PROVIDER VISIT (OUTPATIENT)
Dept: CARDIOLOGY | Facility: MEDICAL CENTER | Age: 72
End: 2017-08-28
Payer: COMMERCIAL

## 2017-08-28 DIAGNOSIS — R00.1 BRADYCARDIA: ICD-10-CM

## 2017-08-28 DIAGNOSIS — I49.1 PREMATURE ATRIAL CONTRACTION: ICD-10-CM

## 2017-09-01 DIAGNOSIS — R00.1 BRADYCARDIA: ICD-10-CM

## 2017-09-01 LAB — EKG IMPRESSION: NORMAL

## 2017-09-01 PROCEDURE — 93224 XTRNL ECG REC UP TO 48 HRS: CPT | Performed by: INTERNAL MEDICINE

## 2017-09-05 ENCOUNTER — TELEPHONE (OUTPATIENT)
Dept: CARDIOLOGY | Facility: MEDICAL CENTER | Age: 72
End: 2017-09-05

## 2017-09-05 NOTE — TELEPHONE ENCOUNTER
S/w pt about Holter results as read by Dr. Watts:  Summary:   Normal sinus rhythm/ sinus arrhythmia   No significant arrhythmias noted     Electronically Signed On 9-1-2017 9:53:55 PDT by Elizabeth Watts MD   -------------------  Educated pt that Dr. Watts will advise on surgical clearance during FU appointment on 9/13. Pt appreciative of information and denies any questions at this time.     ----- Message from Liza Lancaster sent at 9/5/2017 11:09 AM PDT -----  Regarding: heart monitor results  Contact: [phone]  ROSIE/azeb    Pt calling for heart monitor results, please call Alexsandra at work # [phone] today until 4pm.

## 2017-09-07 ENCOUNTER — TELEPHONE (OUTPATIENT)
Dept: CARDIOLOGY | Facility: MEDICAL CENTER | Age: 72
End: 2017-09-07

## 2017-09-09 ENCOUNTER — HOSPITAL ENCOUNTER (OUTPATIENT)
Dept: LAB | Facility: MEDICAL CENTER | Age: 72
End: 2017-09-09
Attending: INTERNAL MEDICINE
Payer: COMMERCIAL

## 2017-09-09 DIAGNOSIS — I10 ESSENTIAL HYPERTENSION: ICD-10-CM

## 2017-09-09 LAB
ANION GAP SERPL CALC-SCNC: 8 MMOL/L (ref 0–11.9)
BUN SERPL-MCNC: 26 MG/DL (ref 8–22)
CALCIUM SERPL-MCNC: 9.6 MG/DL (ref 8.5–10.5)
CHLORIDE SERPL-SCNC: 106 MMOL/L (ref 96–112)
CO2 SERPL-SCNC: 29 MMOL/L (ref 20–33)
CREAT SERPL-MCNC: 1.05 MG/DL (ref 0.5–1.4)
GFR SERPL CREATININE-BSD FRML MDRD: 51 ML/MIN/1.73 M 2
GLUCOSE SERPL-MCNC: 113 MG/DL (ref 65–99)
POTASSIUM SERPL-SCNC: 3.9 MMOL/L (ref 3.6–5.5)
SODIUM SERPL-SCNC: 143 MMOL/L (ref 135–145)

## 2017-09-09 PROCEDURE — 36415 COLL VENOUS BLD VENIPUNCTURE: CPT

## 2017-09-09 PROCEDURE — 80048 BASIC METABOLIC PNL TOTAL CA: CPT

## 2017-09-13 ENCOUNTER — OFFICE VISIT (OUTPATIENT)
Dept: CARDIOLOGY | Facility: MEDICAL CENTER | Age: 72
End: 2017-09-13
Payer: COMMERCIAL

## 2017-09-13 VITALS
HEART RATE: 68 BPM | OXYGEN SATURATION: 94 % | BODY MASS INDEX: 27.05 KG/M2 | SYSTOLIC BLOOD PRESSURE: 120 MMHG | WEIGHT: 147 LBS | HEIGHT: 62 IN | DIASTOLIC BLOOD PRESSURE: 72 MMHG

## 2017-09-13 DIAGNOSIS — I10 ESSENTIAL HYPERTENSION: ICD-10-CM

## 2017-09-13 PROCEDURE — 99214 OFFICE O/P EST MOD 30 MIN: CPT | Performed by: INTERNAL MEDICINE

## 2017-09-13 RX ORDER — LISINOPRIL 5 MG/1
5 TABLET ORAL DAILY
Qty: 90 TAB | Refills: 3 | Status: SHIPPED | OUTPATIENT
Start: 2017-09-13 | End: 2018-06-13

## 2017-09-13 ASSESSMENT — ENCOUNTER SYMPTOMS
ORTHOPNEA: 0
BRUISES/BLEEDS EASILY: 0
DEPRESSION: 0
LOSS OF CONSCIOUSNESS: 0
DIZZINESS: 0
PALPITATIONS: 0
SHORTNESS OF BREATH: 0
ABDOMINAL PAIN: 0
FALLS: 0
PND: 0

## 2017-09-13 NOTE — LETTER
[facility]   [address] [place] [address]  JOAQUIN Ramos [address]  Phone: [phone]  Fax: [phone]              Alexsandra Woods  1945    Encounter Date: 9/13/2017    Elizabeth Watts M.D.          PROGRESS NOTE:  Subjective:   Alexsandra Woods is a 72 y.o. female with past medical history significant for hypertension who Is presenting to clinic for follow-up. Since her last visit, she has stopped taking the atenolol. She has been compliant with the lisinopril and her blood pressure at home has been ranging from 110s to 130s/70s to 80s. She has been overall asymptomatic.    She continues to work at the [employer] in the [employer]. Continues to report a very stressful job but has been trying to avoid being too stressed out. Continues to walk a lot of steps at work. No regular exercise.    She has a history of breast cancer in 2003 status post chemotherapy. Patient does not know about her chemotherapy regimen. No history of radiation. Underwent a double mastectomy at that time.     Past Medical History:   Diagnosis Date   • Cancer (CMS-HCC) 2003    breast RIGHT SIDE   • Allergic rhinitis due to pollen    • Anemia     from chemo   • Breast cancer (CMS-HCC)     right, chemotherapy   • Cataract     Fausto IOL   • High cholesterol    • HTN (hypertension)    • Hypothyroid    • Rotator cuff disorder    • Shingles    • Tinnitus      Past Surgical History:   Procedure Laterality Date   • CATARACT EXTRACTION WITH IOL Bilateral 7/2015   • LAMINOTOMY  11/18/2009    LEFT Performed by SARITA BURKS at SURGERY [facility] ORS   • ROTATOR CUFF REPAIR  2/5/2009    Performed by OSGOOD, PATRICK J at SURGERY [facility] ORS   • SHOULDER DECOMPRESSION  2/5/2009    Performed by OSGOOD, PATRICK J at SURGERY [facility] ORS   • ROTATOR CUFF REPAIR Right 2005   • KNEE ARTHROSCOPY Right 2005   • BREAST RECONSTRUCTION  6/2004   • MASTECTOMY Bilateral 2003     excision lymph nodes  right axillary   • CATH, PORT-A-CATH  2003   • BREAST IMPLANT REMOVAL      expanders removed   • CYST EXCISION     • LYMPH NODE EXCISION Right     arm   • OTHER SURGICAL PROCEDURE      9/2003 port placed, 3/2004 port removed     Family History   Problem Relation Age of Onset   • Stroke Mother 78   • Cancer Mother      Cervical   • Lung Disease Mother    • Hypertension Father    • Cancer Maternal Grandfather      Lung     History   Smoking Status   • Never Smoker   Smokeless Tobacco   • Never Used     Occasional alcohol. No recreational drug use.    Allergies   Allergen Reactions   • Nkda [No Known Drug Allergy]      Outpatient Encounter Prescriptions as of 9/13/2017   Medication Sig Dispense Refill   • lisinopril (PRINIVIL) 5 MG Tab Take 1 Tab by mouth every day. 90 Tab 3   • levothyroxine (SYNTHROID) 75 MCG Tab Take 75 mcg by mouth Every morning on an empty stomach.     • ascorbic acid (VITAMIN C) 100 MG tablet Take 1,000 mg by mouth every day.     • cholecalciferol (VITAMIN D3) 5000 UNIT Cap Take 5,000 Units by mouth every day.     • B Complex-C (SUPER B COMPLEX PO) Take 1 Tab by mouth every day.     • Glucosamine HCl (GLUCOSAMINE PO) Take 1 Tab by mouth every day.     • multivitamin (THERAGRAN) Tab Take 1 Tab by mouth every day.     • triamterene/hctz (MAXZIDE-25/DYAZIDE) 37.5-25 MG CAPS Take 1 Cap by mouth every morning. 30 Cap 0   • [DISCONTINUED] lisinopril (PRINIVIL) 5 MG Tab Take 1 Tab by mouth every day. 30 Tab 11     No facility-administered encounter medications on file as of 9/13/2017.      Review of Systems   Constitutional: Negative for malaise/fatigue.   HENT: Negative.    Respiratory: Negative for shortness of breath.    Cardiovascular: Negative for chest pain, palpitations, orthopnea, leg swelling and PND.   Gastrointestinal: Negative for abdominal pain.   Musculoskeletal: Negative for falls.   Skin: Negative.    Neurological: Negative for dizziness and loss of consciousness.   Endo/Heme/Allergies:  "Does not bruise/bleed easily.   Psychiatric/Behavioral: Negative for depression.   All other systems reviewed and are negative.       Objective:   /72   Pulse 68   Ht 1.575 m (5' 2\")   Wt 66.7 kg (147 lb)   SpO2 94%   BMI 26.89 kg/m²      Physical Exam   Constitutional: She is oriented to person, place, and time. No distress.   HENT:   Head: Normocephalic and atraumatic.   Eyes: Conjunctivae are normal.   Neck: Normal range of motion. Neck supple. No JVD present.   Cardiovascular: Normal rate, regular rhythm and normal heart sounds.  Exam reveals no gallop and no friction rub.    No murmur heard.  Pulmonary/Chest: Effort normal and breath sounds normal. No respiratory distress. She has no wheezes. She has no rales.   Abdominal: Soft. There is no tenderness.   Musculoskeletal: She exhibits no edema.   Neurological: She is alert and oriented to person, place, and time.   Skin: Skin is warm and dry. She is not diaphoretic.   Psychiatric: She has a normal mood and affect.   Nursing note and vitals reviewed.    EKG from August 2017 showed sinus bradycardia with sinus arrhythmia in the 40s beats per minute, high voltages noted however does not meet criteria for LVH.    Labs from September 2017 were reviewed. Creatinine normal. Potassium 3.9.    Holter monitor from August 2017 was reviewed  Interpretive Statements   *  Monitoring started at 8:02 AM and continued for 48 hours. Cardiac rhythm   is Sinus Arrhythmia. The average heart rate   was 69 BPM. The minimum heart rate was 32 BPM, occurring at 5:29:53 AM.   The maximum heart rate was 113 BPM,   occurring at 8:16:23 AM. The longest R-R interval was 2.0 seconds   occurring at 2:45:09 AM D2.   *  Ventricular ectopic activity consisted of four single PVCs.   *  The patient's rhythm included 11 hours 30 minutes 17 seconds of sinus   bradycardia. The slowest single episode   occurred at 5:24:13 AM D1, lasting 16 minutes 17 seconds, with minimum   heart rate of 32 " BPM.   *  The patient's rhythm included 19 minutes nine seconds of sinus   tachycardia.   *  Supraventricular ectopic activity consisted of three atrial couplets,   1,406 late beats, 30 single PACs. There were six   dropped beats.   *  Diary entries - symptoms listed in diary were noted with sinus rhythm with   no ectopics. Heart rate during symptoms   46 to 90 BPM.     Summary:   Normal sinus rhythm/ sinus arrhythmia   No significant arrhythmias noted     Assessment:     1. Essential hypertension  lisinopril (PRINIVIL) 5 MG Tab       Medical Decision Making:  Today's Assessment / Status / Plan:     Her blood pressure is now well controlled. She will continue her current medication regimen. I have prescribed her lisinopril for 90 day duration.    Her sinus bradycardia was likely secondary to atenolol. It is now resolved as demonstrated on the Holter monitor. Average heart rate was in the high 60s. No further workup needed for this.    Patient is currently scheduled for a carpal tunnel surgery. Overall likely a low risk surgery. Patient does not exercise regularly but she is able to perform well over 4 METs. No further risk stratification is indicated at this time. Her blood pressure and her heart rate are now at goal. Please proceed with planned procedure.    Return to clinic in 6 months or earlier if needed.    Thank you for allowing me to participate in the care of this patient. Please do not hesitate to contact me with any questions.    Elizabeth Watts MD  Cardiologist  [facility]      PLEASE NOTE: This dictation was created using voice recognition software.         No Recipients

## 2017-09-13 NOTE — PROGRESS NOTES
Subjective:   Alexsandra Woods is a 72 y.o. female with past medical history significant for hypertension who Is presenting to clinic for follow-up. Since her last visit, she has stopped taking the atenolol. She has been compliant with the lisinopril and her blood pressure at home has been ranging from 110s to 130s/70s to 80s. She has been overall asymptomatic.    She continues to work at the [employer] in the [employer]. Continues to report a very stressful job but has been trying to avoid being too stressed out. Continues to walk a lot of steps at work. No regular exercise.    Her main question today is if she can proceed with her carpal tunnel surgery.    She has a history of breast cancer in 2003 status post chemotherapy. Patient does not know about her chemotherapy regimen. No history of radiation. Underwent a double mastectomy at that time.     Past Medical History:   Diagnosis Date   • Cancer (CMS-[facility]) 2003    breast RIGHT SIDE   • Allergic rhinitis due to pollen    • Anemia     from chemo   • Breast cancer (CMS-[facility])     right, chemotherapy   • Cataract     Fausto IOL   • High cholesterol    • HTN (hypertension)    • Hypothyroid    • Rotator cuff disorder    • Shingles    • Tinnitus      Past Surgical History:   Procedure Laterality Date   • CATARACT EXTRACTION WITH IOL Bilateral 7/2015   • LAMINOTOMY  11/18/2009    LEFT Performed by SARITA BURKS at SURGERY [facility] ORS   • ROTATOR CUFF REPAIR  2/5/2009    Performed by OSGOOD, PATRICK J at SURGERY [facility] ORS   • SHOULDER DECOMPRESSION  2/5/2009    Performed by OSGOOD, PATRICK J at SURGERY [facility] ORS   • ROTATOR CUFF REPAIR Right 2005   • KNEE ARTHROSCOPY Right 2005   • BREAST RECONSTRUCTION  6/2004   • MASTECTOMY Bilateral 2003     excision lymph nodes right axillary   • CATH, PORT-A-CATH  2003   • BREAST IMPLANT REMOVAL      expanders removed   • CYST EXCISION     • LYMPH NODE EXCISION Right     arm   • OTHER SURGICAL PROCEDURE       9/2003 port placed, 3/2004 port removed     Family History   Problem Relation Age of Onset   • Stroke Mother 78   • Cancer Mother      Cervical   • Lung Disease Mother    • Hypertension Father    • Cancer Maternal Grandfather      Lung     History   Smoking Status   • Never Smoker   Smokeless Tobacco   • Never Used     Occasional alcohol. No recreational drug use.    Allergies   Allergen Reactions   • Nkda [No Known Drug Allergy]      Outpatient Encounter Prescriptions as of 9/13/2017   Medication Sig Dispense Refill   • lisinopril (PRINIVIL) 5 MG Tab Take 1 Tab by mouth every day. 90 Tab 3   • levothyroxine (SYNTHROID) 75 MCG Tab Take 75 mcg by mouth Every morning on an empty stomach.     • ascorbic acid (VITAMIN C) 100 MG tablet Take 1,000 mg by mouth every day.     • cholecalciferol (VITAMIN D3) 5000 UNIT Cap Take 5,000 Units by mouth every day.     • B Complex-C (SUPER B COMPLEX PO) Take 1 Tab by mouth every day.     • Glucosamine HCl (GLUCOSAMINE PO) Take 1 Tab by mouth every day.     • multivitamin (THERAGRAN) Tab Take 1 Tab by mouth every day.     • triamterene/hctz (MAXZIDE-25/DYAZIDE) 37.5-25 MG CAPS Take 1 Cap by mouth every morning. 30 Cap 0   • [DISCONTINUED] lisinopril (PRINIVIL) 5 MG Tab Take 1 Tab by mouth every day. 30 Tab 11     No facility-administered encounter medications on file as of 9/13/2017.      Review of Systems   Constitutional: Negative for malaise/fatigue.   HENT: Negative.    Respiratory: Negative for shortness of breath.    Cardiovascular: Negative for chest pain, palpitations, orthopnea, leg swelling and PND.   Gastrointestinal: Negative for abdominal pain.   Musculoskeletal: Negative for falls.   Skin: Negative.    Neurological: Negative for dizziness and loss of consciousness.   Endo/Heme/Allergies: Does not bruise/bleed easily.   Psychiatric/Behavioral: Negative for depression.   All other systems reviewed and are negative.       Objective:   /72   Pulse 68   Ht 1.575 m  "(5' 2\")   Wt 66.7 kg (147 lb)   SpO2 94%   BMI 26.89 kg/m²     Physical Exam   Constitutional: She is oriented to person, place, and time. No distress.   HENT:   Head: Normocephalic and atraumatic.   Eyes: Conjunctivae are normal.   Neck: Normal range of motion. Neck supple. No JVD present.   Cardiovascular: Normal rate, regular rhythm and normal heart sounds.  Exam reveals no gallop and no friction rub.    No murmur heard.  Pulmonary/Chest: Effort normal and breath sounds normal. No respiratory distress. She has no wheezes. She has no rales.   Abdominal: Soft. There is no tenderness.   Musculoskeletal: She exhibits no edema.   Neurological: She is alert and oriented to person, place, and time.   Skin: Skin is warm and dry. She is not diaphoretic.   Psychiatric: She has a normal mood and affect.   Nursing note and vitals reviewed.    EKG from August 2017 showed sinus bradycardia with sinus arrhythmia in the 40s beats per minute, high voltages noted however does not meet criteria for LVH.    Labs from September 2017 were reviewed. Creatinine normal. Potassium 3.9.    Holter monitor from August 2017 was reviewed  Interpretive Statements   *  Monitoring started at 8:02 AM and continued for 48 hours. Cardiac rhythm   is Sinus Arrhythmia. The average heart rate   was 69 BPM. The minimum heart rate was 32 BPM, occurring at 5:29:53 AM.   The maximum heart rate was 113 BPM,   occurring at 8:16:23 AM. The longest R-R interval was 2.0 seconds   occurring at 2:45:09 AM D2.   *  Ventricular ectopic activity consisted of four single PVCs.   *  The patient's rhythm included 11 hours 30 minutes 17 seconds of sinus   bradycardia. The slowest single episode   occurred at 5:24:13 AM D1, lasting 16 minutes 17 seconds, with minimum   heart rate of 32 BPM.   *  The patient's rhythm included 19 minutes nine seconds of sinus   tachycardia.   *  Supraventricular ectopic activity consisted of three atrial couplets,   1,406 late beats, 30 " single PACs. There were six   dropped beats.   *  Diary entries - symptoms listed in diary were noted with sinus rhythm with   no ectopics. Heart rate during symptoms   46 to 90 BPM.     Summary:   Normal sinus rhythm/ sinus arrhythmia   No significant arrhythmias noted     Assessment:     1. Essential hypertension  lisinopril (PRINIVIL) 5 MG Tab       Medical Decision Making:  Today's Assessment / Status / Plan:     Her blood pressure is now well controlled. She will continue her current medication regimen. I have prescribed her lisinopril for 90 day duration.    Her sinus bradycardia was likely secondary to atenolol. It is now resolved as demonstrated on the Holter monitor. Average heart rate was in the high 60s. No further workup needed for this.    Patient is currently scheduled for a carpal tunnel surgery. Overall likely a low risk surgery. Patient does not exercise regularly but she is able to perform well over 4 METs. No further risk stratification is indicated at this time. Her blood pressure and her heart rate are now at goal. Please proceed with planned procedure.    Return to clinic in 6 months or earlier if needed.    Thank you for allowing me to participate in the care of this patient. Please do not hesitate to contact me with any questions.    Elizabeth Watts MD  Cardiologist  [facility]      PLEASE NOTE: This dictation was created using voice recognition software.

## 2017-09-22 ENCOUNTER — APPOINTMENT (OUTPATIENT)
Dept: ADMISSIONS | Facility: MEDICAL CENTER | Age: 72
End: 2017-09-22
Attending: ORTHOPAEDIC SURGERY
Payer: COMMERCIAL

## 2017-09-22 DIAGNOSIS — Z01.812 PRE-OPERATIVE LABORATORY EXAMINATION: ICD-10-CM

## 2017-09-22 LAB
ERYTHROCYTE [DISTWIDTH] IN BLOOD BY AUTOMATED COUNT: 42.5 FL (ref 35.9–50)
HCT VFR BLD AUTO: 41.1 % (ref 37–47)
HGB BLD-MCNC: 13.9 G/DL (ref 12–16)
MCH RBC QN AUTO: 30.3 PG (ref 27–33)
MCHC RBC AUTO-ENTMCNC: 33.8 G/DL (ref 33.6–35)
MCV RBC AUTO: 89.7 FL (ref 81.4–97.8)
PLATELET # BLD AUTO: 186 K/UL (ref 164–446)
PMV BLD AUTO: 11 FL (ref 9–12.9)
RBC # BLD AUTO: 4.58 M/UL (ref 4.2–5.4)
WBC # BLD AUTO: 4.9 K/UL (ref 4.8–10.8)

## 2017-09-22 PROCEDURE — 36415 COLL VENOUS BLD VENIPUNCTURE: CPT

## 2017-09-22 PROCEDURE — 85027 COMPLETE CBC AUTOMATED: CPT

## 2017-10-03 ENCOUNTER — HOSPITAL ENCOUNTER (OUTPATIENT)
Facility: MEDICAL CENTER | Age: 72
End: 2017-10-03
Attending: ORTHOPAEDIC SURGERY | Admitting: ORTHOPAEDIC SURGERY
Payer: COMMERCIAL

## 2017-10-03 VITALS
WEIGHT: 150.35 LBS | HEART RATE: 70 BPM | TEMPERATURE: 97.9 F | HEIGHT: 62 IN | RESPIRATION RATE: 16 BRPM | SYSTOLIC BLOOD PRESSURE: 164 MMHG | OXYGEN SATURATION: 95 % | BODY MASS INDEX: 27.67 KG/M2 | DIASTOLIC BLOOD PRESSURE: 91 MMHG

## 2017-10-03 PROBLEM — G56.02 CARPAL TUNNEL SYNDROME ON LEFT: Status: ACTIVE | Noted: 2017-10-03

## 2017-10-03 PROCEDURE — 160046 HCHG PACU - 1ST 60 MINS PHASE II: Performed by: ORTHOPAEDIC SURGERY

## 2017-10-03 PROCEDURE — 700101 HCHG RX REV CODE 250

## 2017-10-03 PROCEDURE — 160039 HCHG SURGERY MINUTES - EA ADDL 1 MIN LEVEL 3: Performed by: ORTHOPAEDIC SURGERY

## 2017-10-03 PROCEDURE — 502576 HCHG PACK, HAND: Performed by: ORTHOPAEDIC SURGERY

## 2017-10-03 PROCEDURE — 160035 HCHG PACU - 1ST 60 MINS PHASE I: Performed by: ORTHOPAEDIC SURGERY

## 2017-10-03 PROCEDURE — A6222 GAUZE <=16 IN NO W/SAL W/O B: HCPCS | Performed by: ORTHOPAEDIC SURGERY

## 2017-10-03 PROCEDURE — 501480 HCHG STOCKINETTE: Performed by: ORTHOPAEDIC SURGERY

## 2017-10-03 PROCEDURE — 160002 HCHG RECOVERY MINUTES (STAT): Performed by: ORTHOPAEDIC SURGERY

## 2017-10-03 PROCEDURE — 501838 HCHG SUTURE GENERAL: Performed by: ORTHOPAEDIC SURGERY

## 2017-10-03 PROCEDURE — 160009 HCHG ANES TIME/MIN: Performed by: ORTHOPAEDIC SURGERY

## 2017-10-03 PROCEDURE — 160028 HCHG SURGERY MINUTES - 1ST 30 MINS LEVEL 3: Performed by: ORTHOPAEDIC SURGERY

## 2017-10-03 PROCEDURE — 700111 HCHG RX REV CODE 636 W/ 250 OVERRIDE (IP)

## 2017-10-03 PROCEDURE — 160025 RECOVERY II MINUTES (STATS): Performed by: ORTHOPAEDIC SURGERY

## 2017-10-03 PROCEDURE — 700105 HCHG RX REV CODE 258: Performed by: ORTHOPAEDIC SURGERY

## 2017-10-03 PROCEDURE — 160048 HCHG OR STATISTICAL LEVEL 1-5: Performed by: ORTHOPAEDIC SURGERY

## 2017-10-03 RX ORDER — ONDANSETRON 2 MG/ML
4 INJECTION INTRAMUSCULAR; INTRAVENOUS EVERY 4 HOURS PRN
Status: DISCONTINUED | OUTPATIENT
Start: 2017-10-03 | End: 2017-10-03 | Stop reason: HOSPADM

## 2017-10-03 RX ORDER — OXYCODONE HYDROCHLORIDE 5 MG/1
5 TABLET ORAL
Status: DISCONTINUED | OUTPATIENT
Start: 2017-10-03 | End: 2017-10-03 | Stop reason: HOSPADM

## 2017-10-03 RX ORDER — DIPHENHYDRAMINE HYDROCHLORIDE 50 MG/ML
25 INJECTION INTRAMUSCULAR; INTRAVENOUS EVERY 6 HOURS PRN
Status: DISCONTINUED | OUTPATIENT
Start: 2017-10-03 | End: 2017-10-03 | Stop reason: HOSPADM

## 2017-10-03 RX ORDER — DEXAMETHASONE SODIUM PHOSPHATE 4 MG/ML
4 INJECTION, SOLUTION INTRA-ARTICULAR; INTRALESIONAL; INTRAMUSCULAR; INTRAVENOUS; SOFT TISSUE
Status: DISCONTINUED | OUTPATIENT
Start: 2017-10-03 | End: 2017-10-03 | Stop reason: HOSPADM

## 2017-10-03 RX ORDER — SODIUM CHLORIDE, SODIUM LACTATE, POTASSIUM CHLORIDE, CALCIUM CHLORIDE 600; 310; 30; 20 MG/100ML; MG/100ML; MG/100ML; MG/100ML
INJECTION, SOLUTION INTRAVENOUS CONTINUOUS
Status: DISCONTINUED | OUTPATIENT
Start: 2017-10-03 | End: 2017-10-03 | Stop reason: HOSPADM

## 2017-10-03 RX ORDER — OXYCODONE HYDROCHLORIDE 5 MG/1
2.5 TABLET ORAL
Status: DISCONTINUED | OUTPATIENT
Start: 2017-10-03 | End: 2017-10-03 | Stop reason: HOSPADM

## 2017-10-03 RX ORDER — LIDOCAINE HYDROCHLORIDE 10 MG/ML
INJECTION, SOLUTION INFILTRATION; PERINEURAL
Status: COMPLETED
Start: 2017-10-03 | End: 2017-10-03

## 2017-10-03 RX ORDER — MORPHINE SULFATE 4 MG/ML
2 INJECTION, SOLUTION INTRAMUSCULAR; INTRAVENOUS
Status: DISCONTINUED | OUTPATIENT
Start: 2017-10-03 | End: 2017-10-03 | Stop reason: HOSPADM

## 2017-10-03 RX ORDER — HYDRALAZINE HYDROCHLORIDE 20 MG/ML
INJECTION INTRAMUSCULAR; INTRAVENOUS
Status: COMPLETED
Start: 2017-10-03 | End: 2017-10-03

## 2017-10-03 RX ORDER — TRAMADOL HYDROCHLORIDE 50 MG/1
50 TABLET ORAL EVERY 4 HOURS PRN
Status: DISCONTINUED | OUTPATIENT
Start: 2017-10-03 | End: 2017-10-03 | Stop reason: HOSPADM

## 2017-10-03 RX ORDER — BUPIVACAINE HYDROCHLORIDE 5 MG/ML
INJECTION, SOLUTION EPIDURAL; INTRACAUDAL
Status: DISCONTINUED | OUTPATIENT
Start: 2017-10-03 | End: 2017-10-03 | Stop reason: HOSPADM

## 2017-10-03 RX ORDER — SCOLOPAMINE TRANSDERMAL SYSTEM 1 MG/1
1 PATCH, EXTENDED RELEASE TRANSDERMAL
Status: DISCONTINUED | OUTPATIENT
Start: 2017-10-03 | End: 2017-10-03 | Stop reason: HOSPADM

## 2017-10-03 RX ORDER — HALOPERIDOL 5 MG/ML
1 INJECTION INTRAMUSCULAR EVERY 6 HOURS PRN
Status: DISCONTINUED | OUTPATIENT
Start: 2017-10-03 | End: 2017-10-03 | Stop reason: HOSPADM

## 2017-10-03 RX ADMIN — HYDRALAZINE HYDROCHLORIDE 10 MG: 20 INJECTION, SOLUTION INTRAMUSCULAR; INTRAVENOUS at 10:25

## 2017-10-03 RX ADMIN — SODIUM CHLORIDE, POTASSIUM CHLORIDE, SODIUM LACTATE AND CALCIUM CHLORIDE: 600; 310; 30; 20 INJECTION, SOLUTION INTRAVENOUS at 09:10

## 2017-10-03 RX ADMIN — LIDOCAINE HYDROCHLORIDE 0.1 ML: 10 INJECTION, SOLUTION INFILTRATION; PERINEURAL at 09:10

## 2017-10-03 ASSESSMENT — PAIN SCALES - GENERAL
PAINLEVEL_OUTOF10: 0

## 2017-10-03 NOTE — OR NURSING
"1003- Pt to pacu via gurney with side rails up.  Respirations unlabored and spontaneous. Pt unarousable. L hand/wrist drsg cdi, brace in place.  L fingers with brisk cap refill, warm to touch.  1018- Pt denies pain/nausea.  States her left fingers feel \"numb\", able to wiggle L fingers, nods yes to sensation of touch. BP elevated.  1022- rechecked BP still elevated, see mar.  1029- l/m on 's voicemail.  1048- VSS.  BP back to baseline.  Pt meets stage 2 criteria.  1053- report to Meghna VAZQUEZ.  "

## 2017-10-03 NOTE — OR NURSING
1100: Patient to stage II from PACU.  Dressings to left hand clean, dry, and intact, cap refill to fingers <3 seconds.  Patient assisted with changing by CNA and is now sitting in recliner chair.  No reported nausea, reports no pain.

## 2017-10-03 NOTE — DISCHARGE INSTRUCTIONS
ACTIVITY: Rest and take it easy for the first 24 hours.  A responsible adult is recommended to remain with you during that time.  It is normal to feel sleepy.  We encourage you to not do anything that requires balance, judgment or coordination.    MILD FLU-LIKE SYMPTOMS ARE NORMAL. YOU MAY EXPERIENCE GENERALIZED MUSCLE ACHES, THROAT IRRITATION, HEADACHE AND/OR SOME NAUSEA.    FOR 24 HOURS DO NOT:  Drive, operate machinery or run household appliances.  Drink beer or alcoholic beverages.   Make important decisions or sign legal documents.    SPECIAL INSTRUCTIONS: Call for fever, chills, increasing pain.  Non weight bearing or lifting with left hand/arm      DIET: To avoid nausea, slowly advance diet as tolerated, avoiding spicy or greasy foods for the first day.  Add more substantial food to your diet according to your physician's instructions.  Babies can be fed formula or breast milk as soon as they are hungry.  INCREASE FLUIDS AND FIBER TO AVOID CONSTIPATION.    SURGICAL DRESSING/BATHING: may Shower with wound covered - begin post op day # 1. Leave dressing on for 3 days, then may shower with wound uncovered.    FOLLOW-UP APPOINTMENT:  A follow-up appointment should be arranged with your doctor; call to schedule.    You should CALL YOUR PHYSICIAN if you develop:  Fever greater than 101 degrees F.  Pain not relieved by medication, or persistent nausea or vomiting.  Excessive bleeding (blood soaking through dressing) or unexpected drainage from the wound.  Extreme redness or swelling around the incision site, drainage of pus or foul smelling drainage.  Inability to urinate or empty your bladder within 8 hours.  Problems with breathing or chest pain.    You should call 911 if you develop problems with breathing or chest pain.  If you are unable to contact your doctor or surgical center, you should go to the nearest emergency room or urgent care center.  Physician's telephone #: [phone] Dr Madrid    If any  questions arise, call your doctor.  If your doctor is not available, please feel free to call the Surgical Center at [phone].  The Center is open Monday through Friday from 7AM to 7PM.  You can also call the HEALTH HOTLINE open 24 hours/day, 7 days/week and speak to a nurse at [phone], or toll free at [phone].    A registered nurse may call you a few days after your surgery to see how you are doing after your procedure.    MEDICATIONS: Resume taking daily medication.  Take prescribed pain medication with food.  If no medication is prescribed, you may take non-aspirin pain medication if needed.  PAIN MEDICATION CAN BE VERY CONSTIPATING.  Take a stool softener or laxative such as senokot, pericolace, or milk of magnesia if needed.    Prescription given for N/A, Pt has pain med at home.  Last pain medication given at None given in recovery room.    If your physician has prescribed pain medication that includes Acetaminophen (Tylenol), do not take additional Acetaminophen (Tylenol) while taking the prescribed medication.    Depression / Suicide Risk    As you are discharged from this [facility] facility, it is important to learn how to keep safe from harming yourself.    Recognize the warning signs:  · Abrupt changes in personality, positive or negative- including increase in energy   · Giving away possessions  · Change in eating patterns- significant weight changes-  positive or negative  · Change in sleeping patterns- unable to sleep or sleeping all the time   · Unwillingness or inability to communicate  · Depression  · Unusual sadness, discouragement and loneliness  · Talk of wanting to die  · Neglect of personal appearance   · Rebelliousness- reckless behavior  · Withdrawal from people/activities they love  · Confusion- inability to concentrate     If you or a loved one observes any of these behaviors or has concerns about self-harm, here's what you can do:  · Talk about it- your feelings and  reasons for harming yourself  · Remove any means that you might use to hurt yourself (examples: pills, rope, extension cords, firearm)  · Get professional help from the community (Mental Health, Substance Abuse, psychological counseling)  · Do not be alone:Call your Safe Contact- someone whom you trust who will be there for you.  · Call your local CRISIS HOTLINE 547-3618 or 678-966-2816  · Call your local Children's Mobile Crisis Response Team [place] (377) 948-1507 or www.[employer]  · Call the toll free National Suicide Prevention Hotlines   · National Suicide Prevention Lifeline 798-142-XPTE (4387)  · National Hope Line Network 800-SUICIDE (696-3947)

## 2017-10-03 NOTE — OP REPORT
Operative Report    Date: 10/3/2017    Surgeon: Josue Madrid    Assistant: None    Anesthesiologist: Te in    Pre-operative Diagnosis: Left carpal tunnel syndrome    Post-operative Diagnosis: Same    Procedure: Left carpal tunnel release.    Indications:   72-year-old female with classic symptoms of left carpal tunnel syndrome. Patient has thenar atrophy and unprovoked numbness into the median distribution. Patient's at the same problem with good relief after carpal tunnel release on the right upper extremity.    Findings: Median nerve noted to be uninjured though flat and somewhat hyperemic consistent with carpal tunnel syndrome.    Procedure in detail: Patient brought to the operative room and placed upon the OR table. Gen. anesthesia induced smoothly. Left upper extremity was sterilely prepped and draped. Timeout was called confirming the correct patient side site procedure and patient receiving preoperative antibiotics. After examination of Esmarch tourniquet was inflated to 200 mmHg.        Next a curvilinear incision was made just ulnar to the thenar crease during ulnarward as it approached the wrist crease and not crossing this. This was taken down bluntly to the transverse carpal ligament. A small nick was made with a 15 blade through the transverse carpal ligament and then staying to the ulnar side of the carpal tunnel using a grooved cutting guide the transverse carpal ligament was released distally under direct visualization. The underlying median nerve and median recurrent nerve were both noted to be uninjured or unharmed during the procedure the grooved cutting guide was then directed proximally and under direct visualization the remainder of the transverse carpal ligament was released. At the end of the procedure the base of the of the carpal tunnel was noted to be without abnormalities or masses. In the median nerve was noted to be unharmed.    Next tourniquet was deflated and removed from the  arm. The wound was thoroughly irrigated with normal saline hemostasis was obtained and wound was closed with interrupted 4-0 nylon suture. Sterile dressing was applied followed by a wrist splint patient was waking up. Obtain recovery room in stable condition.    Tourniquet time 7 minutes.        EBL: Minimal    UOP: Not applicable    Drains: None    Dispo: To the recovery room and home once stable.

## 2017-12-26 ENCOUNTER — HOSPITAL ENCOUNTER (OUTPATIENT)
Dept: LAB | Facility: MEDICAL CENTER | Age: 72
End: 2017-12-26
Attending: FAMILY MEDICINE
Payer: COMMERCIAL

## 2017-12-26 LAB
ALBUMIN SERPL BCP-MCNC: 4.3 G/DL (ref 3.2–4.9)
ALBUMIN/GLOB SERPL: 1.7 G/DL
ALP SERPL-CCNC: 66 U/L (ref 30–99)
ALT SERPL-CCNC: 32 U/L (ref 2–50)
ANION GAP SERPL CALC-SCNC: 7 MMOL/L (ref 0–11.9)
AST SERPL-CCNC: 39 U/L (ref 12–45)
BILIRUB SERPL-MCNC: 0.7 MG/DL (ref 0.1–1.5)
BUN SERPL-MCNC: 22 MG/DL (ref 8–22)
CALCIUM SERPL-MCNC: 10.1 MG/DL (ref 8.5–10.5)
CHLORIDE SERPL-SCNC: 102 MMOL/L (ref 96–112)
CHOLEST SERPL-MCNC: 204 MG/DL (ref 100–199)
CO2 SERPL-SCNC: 28 MMOL/L (ref 20–33)
CREAT SERPL-MCNC: 0.98 MG/DL (ref 0.5–1.4)
ERYTHROCYTE [DISTWIDTH] IN BLOOD BY AUTOMATED COUNT: 43.3 FL (ref 35.9–50)
GFR SERPL CREATININE-BSD FRML MDRD: 55 ML/MIN/1.73 M 2
GLOBULIN SER CALC-MCNC: 2.6 G/DL (ref 1.9–3.5)
GLUCOSE SERPL-MCNC: 108 MG/DL (ref 65–99)
HCT VFR BLD AUTO: 41.9 % (ref 37–47)
HDLC SERPL-MCNC: 59 MG/DL
HGB BLD-MCNC: 13.9 G/DL (ref 12–16)
LDLC SERPL CALC-MCNC: 129 MG/DL
MCH RBC QN AUTO: 30.2 PG (ref 27–33)
MCHC RBC AUTO-ENTMCNC: 33.2 G/DL (ref 33.6–35)
MCV RBC AUTO: 90.9 FL (ref 81.4–97.8)
PLATELET # BLD AUTO: 169 K/UL (ref 164–446)
PMV BLD AUTO: 11.2 FL (ref 9–12.9)
POTASSIUM SERPL-SCNC: 4 MMOL/L (ref 3.6–5.5)
PROT SERPL-MCNC: 6.9 G/DL (ref 6–8.2)
RBC # BLD AUTO: 4.61 M/UL (ref 4.2–5.4)
SODIUM SERPL-SCNC: 137 MMOL/L (ref 135–145)
TRIGL SERPL-MCNC: 78 MG/DL (ref 0–149)
TSH SERPL DL<=0.005 MIU/L-ACNC: 11.75 UIU/ML (ref 0.38–5.33)
WBC # BLD AUTO: 3.6 K/UL (ref 4.8–10.8)

## 2017-12-26 PROCEDURE — 80061 LIPID PANEL: CPT

## 2017-12-26 PROCEDURE — 36415 COLL VENOUS BLD VENIPUNCTURE: CPT

## 2017-12-26 PROCEDURE — 80053 COMPREHEN METABOLIC PANEL: CPT

## 2017-12-26 PROCEDURE — 85027 COMPLETE CBC AUTOMATED: CPT

## 2017-12-26 PROCEDURE — 84443 ASSAY THYROID STIM HORMONE: CPT

## 2018-04-25 ENCOUNTER — HOSPITAL ENCOUNTER (OUTPATIENT)
Dept: RADIOLOGY | Facility: MEDICAL CENTER | Age: 73
End: 2018-04-25
Attending: SPECIALIST
Payer: COMMERCIAL

## 2018-04-25 DIAGNOSIS — J20.8 ACUTE BRONCHITIS DUE TO OTHER SPECIFIED ORGANISMS: ICD-10-CM

## 2018-04-25 PROCEDURE — 71046 X-RAY EXAM CHEST 2 VIEWS: CPT

## 2018-05-19 ENCOUNTER — HOSPITAL ENCOUNTER (OUTPATIENT)
Dept: LAB | Facility: MEDICAL CENTER | Age: 73
End: 2018-05-19
Attending: FAMILY MEDICINE
Payer: COMMERCIAL

## 2018-05-19 LAB
T4 FREE SERPL-MCNC: 2.62 NG/DL (ref 0.53–1.43)
TSH SERPL DL<=0.005 MIU/L-ACNC: 0.19 UIU/ML (ref 0.38–5.33)

## 2018-05-19 PROCEDURE — 36415 COLL VENOUS BLD VENIPUNCTURE: CPT

## 2018-05-19 PROCEDURE — 84443 ASSAY THYROID STIM HORMONE: CPT

## 2018-05-19 PROCEDURE — 84439 ASSAY OF FREE THYROXINE: CPT

## 2018-06-13 ENCOUNTER — OFFICE VISIT (OUTPATIENT)
Dept: CARDIOLOGY | Facility: MEDICAL CENTER | Age: 73
End: 2018-06-13
Payer: COMMERCIAL

## 2018-06-13 VITALS
WEIGHT: 149 LBS | BODY MASS INDEX: 27.42 KG/M2 | DIASTOLIC BLOOD PRESSURE: 74 MMHG | HEIGHT: 62 IN | SYSTOLIC BLOOD PRESSURE: 130 MMHG | HEART RATE: 74 BPM | OXYGEN SATURATION: 94 %

## 2018-06-13 DIAGNOSIS — R05.9 COUGH: ICD-10-CM

## 2018-06-13 DIAGNOSIS — I10 ESSENTIAL HYPERTENSION: ICD-10-CM

## 2018-06-13 PROCEDURE — 99214 OFFICE O/P EST MOD 30 MIN: CPT | Performed by: INTERNAL MEDICINE

## 2018-06-13 RX ORDER — LOSARTAN POTASSIUM 25 MG/1
25 TABLET ORAL DAILY
Qty: 30 TAB | Refills: 11 | Status: SHIPPED | OUTPATIENT
Start: 2018-06-13 | End: 2018-07-02 | Stop reason: SDUPTHER

## 2018-06-13 RX ORDER — LEVOTHYROXINE SODIUM 88 UG/1
TABLET ORAL
Refills: 3 | COMMUNITY
Start: 2018-05-30 | End: 2021-10-28 | Stop reason: SDUPTHER

## 2018-06-13 ASSESSMENT — ENCOUNTER SYMPTOMS
ORTHOPNEA: 0
FALLS: 0
COUGH: 1
BRUISES/BLEEDS EASILY: 0
PND: 0
LOSS OF CONSCIOUSNESS: 0
DEPRESSION: 0
SHORTNESS OF BREATH: 0
DIZZINESS: 0
PALPITATIONS: 0
ABDOMINAL PAIN: 0

## 2018-06-13 NOTE — PROGRESS NOTES
Subjective:   Alexsandra Woods is a 72 y.o. female presenting to clinic for management of a cough.  Patient first started having cough around November 2017 at which time she was thought to have the flu.  Unfortunately her symptoms have persisted for the past 8 months.  She has been evaluated by ENT and possible concern for ACE inhibitor induced cough.  She continues to have severe symptoms.  Nonproductive cough.  Almost daily symptoms.  Sometimes she gets short of breath with it.  Also reports hoarseness of her voice secondary to the cough.    Pertinent history:  Hypertension  History of breast cancer in 2003 status post mastectomy and chemotherapy.  No radiation.    She has been compliant with her lisinopril.  Her blood pressure is mostly well controlled, like today.    She was in the [employer] in the [employer] department.  Continues to have a very stressful job but has been coping well with it.    Past Medical History:   Diagnosis Date   • Allergic rhinitis due to pollen    • Anemia     from chemo   • Breast cancer (HCC)     right, chemotherapy   • Cancer (HCC) 2003    breast RIGHT SIDE   • Cataract     Fausto IOL   • High cholesterol    • HTN (hypertension)    • Hypothyroid    • Pneumonia 1969   • Rotator cuff disorder    • Shingles    • Tinnitus      Past Surgical History:   Procedure Laterality Date   • CARPAL TUNNEL RELEASE Left 10/3/2017    Procedure: CARPAL TUNNEL RELEASE;  Surgeon: Sarita Madrid M.D.;  Location: SURGERY [facility];  Service: Orthopedics   • CATARACT EXTRACTION WITH IOL Bilateral 7/2015   • CARPAL TUNNEL RELEASE  2015   • LAMINOTOMY  11/18/2009    LEFT Performed by SARITA BURKS at SURGERY [facility]   • ROTATOR CUFF REPAIR  2/5/2009    Performed by OSGOOD, PATRICK J at [facility]   • SHOULDER DECOMPRESSION  2/5/2009    Performed by OSGOOD, PATRICK J at [facility]   • ROTATOR CUFF REPAIR Right 2009   • KNEE ARTHROSCOPY Right 2005   • BREAST  RECONSTRUCTION  6/2004   • MASTECTOMY Bilateral 2003     excision lymph nodes right axillary   • CATH, PORT-A-CATH  2003   • BREAST IMPLANT REMOVAL      expanders removed   • CYST EXCISION     • LYMPH NODE EXCISION Right     arm   • OTHER SURGICAL PROCEDURE      9/2003 port placed, 3/2004 port removed     Family History   Problem Relation Age of Onset   • Stroke Mother 78   • Cancer Mother      Cervical   • Lung Disease Mother    • Hypertension Father    • Cancer Maternal Grandfather      Lung     History   Smoking Status   • Never Smoker   Smokeless Tobacco   • Never Used     Occasional alcohol. No recreational drug use.    Allergies   Allergen Reactions   • Lisinopril Cough     Outpatient Encounter Prescriptions as of 6/13/2018   Medication Sig Dispense Refill   • levothyroxine (SYNTHROID) 88 MCG Tab TAKE 1 TABLET BY MOUTH DAILY  3   • losartan (COZAAR) 25 MG Tab Take 1 Tab by mouth every day. 30 Tab 11   • ascorbic acid (VITAMIN C) 100 MG tablet Take 1,000 mg by mouth every day.     • cholecalciferol (VITAMIN D3) 5000 UNIT Cap Take 5,000 Units by mouth every day.     • B Complex-C (SUPER B COMPLEX PO) Take 1 Tab by mouth every day.     • Glucosamine HCl (GLUCOSAMINE PO) Take 1 Tab by mouth every day.     • multivitamin (THERAGRAN) Tab Take 1 Tab by mouth every day.     • triamterene/hctz (MAXZIDE-25/DYAZIDE) 37.5-25 MG CAPS Take 1 Cap by mouth every morning. 30 Cap 0   • [DISCONTINUED] lisinopril (PRINIVIL) 5 MG Tab Take 1 Tab by mouth every day. 90 Tab 3   • [DISCONTINUED] levothyroxine (SYNTHROID) 75 MCG Tab Take 75 mcg by mouth Every morning on an empty stomach.       No facility-administered encounter medications on file as of 6/13/2018.      Review of Systems   Constitutional: Negative for malaise/fatigue.   Respiratory: Positive for cough. Negative for shortness of breath.    Cardiovascular: Negative for chest pain, palpitations, orthopnea, leg swelling and PND.   Gastrointestinal: Negative for abdominal  "pain.   Musculoskeletal: Negative for falls.   Neurological: Negative for dizziness and loss of consciousness.   Endo/Heme/Allergies: Does not bruise/bleed easily.   Psychiatric/Behavioral: Negative for depression.   All other systems reviewed and are negative.       Objective:   /74   Pulse 74   Ht 1.575 m (5' 2\")   Wt 67.6 kg (149 lb)   SpO2 94%   BMI 27.25 kg/m²     Physical Exam   Constitutional: She is oriented to person, place, and time. No distress.   HENT:   Head: Normocephalic and atraumatic.   Eyes: Conjunctivae are normal.   Neck: Normal range of motion. Neck supple. No JVD present.   Cardiovascular: Normal rate, regular rhythm and normal heart sounds.  Exam reveals no gallop and no friction rub.    No murmur heard.  Pulmonary/Chest: Effort normal and breath sounds normal. No respiratory distress. She has no wheezes. She has no rales.   Abdominal: Soft. There is no tenderness.   Musculoskeletal: She exhibits no edema.   Neurological: She is alert and oriented to person, place, and time.   Skin: Skin is warm and dry. She is not diaphoretic.   Psychiatric: She has a normal mood and affect.   Nursing note and vitals reviewed.    Holter monitor from August 2017 showed normal sinus rhythm.  No significant arrhythmias noted.    Labs performed in December 2017 were reviewed and showed hemoglobin 13.9.  Creatinine 0.9.  .    Assessment:     1. Essential hypertension     2. Cough         Medical Decision Making:  Today's Assessment / Status / Plan:     Dry cough: new issue. Likely secondary to ACE inhibitors.  Her lisinopril was discontinued today and instead she was started on losartan 25 mg once daily.  Lisinopril entered as a possible allergy.    Hypertension: Blood pressure at goal.  Continue hydrochlorothiazide and triamterene at current dose.  Losartan as above.    Return to clinic in 3 months or earlier if needed.    Thank you for allowing me to participate in the care of this patient. " Please do not hesitate to contact me with any questions.    Elizabeth Watts MD  Cardiologist  [facility]      PLEASE NOTE: This dictation was created using voice recognition software.

## 2018-06-13 NOTE — LETTER
[facility]   [address] [place] [address]  JOAQUIN Ramos [address]  Phone: [phone]  Fax: [phone]              Alexsandra Woods  1945    Encounter Date: 6/13/2018    Elizabeth Watts M.D.          PROGRESS NOTE:  Subjective:   Alexsandra Woods is a 72 y.o. female presenting to clinic for management of a cough.  Patient first started having cough around November 2017 at which time she was thought to have the flu.  Unfortunately her symptoms have persisted for the past 8 months.  She has been evaluated by ENT and possible concern for ACE inhibitor induced cough.  She continues to have severe symptoms.  Nonproductive cough.  Almost daily symptoms.  Sometimes she gets short of breath with it.  Also reports hoarseness of her voice secondary to the cough.    Pertinent history:  Hypertension  History of breast cancer in 2003 status post mastectomy and chemotherapy.  No radiation.    She has been compliant with her lisinopril.  Her blood pressure is mostly well controlled, like today.    She was in the [employer] in the [employer] department.  Continues to have a very stressful job but has been coping well with it.    Past Medical History:   Diagnosis Date   • Allergic rhinitis due to pollen    • Anemia     from chemo   • Breast cancer (HCC)     right, chemotherapy   • Cancer (HCC) 2003    breast RIGHT SIDE   • Cataract     Fausto IOL   • High cholesterol    • HTN (hypertension)    • Hypothyroid    • Pneumonia 1969   • Rotator cuff disorder    • Shingles    • Tinnitus      Past Surgical History:   Procedure Laterality Date   • CARPAL TUNNEL RELEASE Left 10/3/2017    Procedure: CARPAL TUNNEL RELEASE;  Surgeon: Sarita Madrid M.D.;  Location: SURGERY [facility];  Service: Orthopedics   • CATARACT EXTRACTION WITH IOL Bilateral 7/2015   • CARPAL TUNNEL RELEASE  2015   • LAMINOTOMY  11/18/2009    LEFT Performed by SARITA BURKS at SURGERY [facility]   • ROTATOR CUFF  REPAIR  2/5/2009    Performed by OSGOOD, PATRICK J at SURGERY [facility]   • SHOULDER DECOMPRESSION  2/5/2009    Performed by OSGOOD, PATRICK J at SURGERY [facility]   • ROTATOR CUFF REPAIR Right 2009   • KNEE ARTHROSCOPY Right 2005   • BREAST RECONSTRUCTION  6/2004   • MASTECTOMY Bilateral 2003     excision lymph nodes right axillary   • CATH, PORT-A-CATH  2003   • BREAST IMPLANT REMOVAL      expanders removed   • CYST EXCISION     • LYMPH NODE EXCISION Right     arm   • OTHER SURGICAL PROCEDURE      9/2003 port placed, 3/2004 port removed     Family History   Problem Relation Age of Onset   • Stroke Mother 78   • Cancer Mother      Cervical   • Lung Disease Mother    • Hypertension Father    • Cancer Maternal Grandfather      Lung     History   Smoking Status   • Never Smoker   Smokeless Tobacco   • Never Used     Occasional alcohol. No recreational drug use.    Allergies   Allergen Reactions   • Lisinopril Cough     Outpatient Encounter Prescriptions as of 6/13/2018   Medication Sig Dispense Refill   • levothyroxine (SYNTHROID) 88 MCG Tab TAKE 1 TABLET BY MOUTH DAILY  3   • losartan (COZAAR) 25 MG Tab Take 1 Tab by mouth every day. 30 Tab 11   • ascorbic acid (VITAMIN C) 100 MG tablet Take 1,000 mg by mouth every day.     • cholecalciferol (VITAMIN D3) 5000 UNIT Cap Take 5,000 Units by mouth every day.     • B Complex-C (SUPER B COMPLEX PO) Take 1 Tab by mouth every day.     • Glucosamine HCl (GLUCOSAMINE PO) Take 1 Tab by mouth every day.     • multivitamin (THERAGRAN) Tab Take 1 Tab by mouth every day.     • triamterene/hctz (MAXZIDE-25/DYAZIDE) 37.5-25 MG CAPS Take 1 Cap by mouth every morning. 30 Cap 0   • [DISCONTINUED] lisinopril (PRINIVIL) 5 MG Tab Take 1 Tab by mouth every day. 90 Tab 3   • [DISCONTINUED] levothyroxine (SYNTHROID) 75 MCG Tab Take 75 mcg by mouth Every morning on an empty stomach.       No facility-administered encounter medications on file as of 6/13/2018.      Review of  "Systems   Constitutional: Negative for malaise/fatigue.   Respiratory: Positive for cough. Negative for shortness of breath.    Cardiovascular: Negative for chest pain, palpitations, orthopnea, leg swelling and PND.   Gastrointestinal: Negative for abdominal pain.   Musculoskeletal: Negative for falls.   Neurological: Negative for dizziness and loss of consciousness.   Endo/Heme/Allergies: Does not bruise/bleed easily.   Psychiatric/Behavioral: Negative for depression.   All other systems reviewed and are negative.       Objective:   /74   Pulse 74   Ht 1.575 m (5' 2\")   Wt 67.6 kg (149 lb)   SpO2 94%   BMI 27.25 kg/m²      Physical Exam   Constitutional: She is oriented to person, place, and time. No distress.   HENT:   Head: Normocephalic and atraumatic.   Eyes: Conjunctivae are normal.   Neck: Normal range of motion. Neck supple. No JVD present.   Cardiovascular: Normal rate, regular rhythm and normal heart sounds.  Exam reveals no gallop and no friction rub.    No murmur heard.  Pulmonary/Chest: Effort normal and breath sounds normal. No respiratory distress. She has no wheezes. She has no rales.   Abdominal: Soft. There is no tenderness.   Musculoskeletal: She exhibits no edema.   Neurological: She is alert and oriented to person, place, and time.   Skin: Skin is warm and dry. She is not diaphoretic.   Psychiatric: She has a normal mood and affect.   Nursing note and vitals reviewed.    Holter monitor from August 2017 showed normal sinus rhythm.  No significant arrhythmias noted.    Labs performed in December 2017 were reviewed and showed hemoglobin 13.9.  Creatinine 0.9.  .    Assessment:     1. Essential hypertension     2. Cough         Medical Decision Making:  Today's Assessment / Status / Plan:     Dry cough: new issue. Likely secondary to ACE inhibitors.  Her lisinopril was discontinued today and instead she was started on losartan 25 mg once daily.  Lisinopril entered as a possible " allergy.    Hypertension: Blood pressure at goal.  Continue hydrochlorothiazide and triamterene at current dose.  Losartan as above.    Return to clinic in 3 months or earlier if needed.    Thank you for allowing me to participate in the care of this patient. Please do not hesitate to contact me with any questions.    Elizabeth Watts MD  Cardiologist  [facility]      PLEASE NOTE: This dictation was created using voice recognition software.         Adalberto Paris M.D.  [address] [place] [id number]  VIA Facsimile: [phone]

## 2018-07-02 DIAGNOSIS — I10 ESSENTIAL HYPERTENSION: Primary | ICD-10-CM

## 2018-07-02 RX ORDER — LOSARTAN POTASSIUM 25 MG/1
25 TABLET ORAL DAILY
Qty: 90 TAB | Refills: 3 | Status: SHIPPED | OUTPATIENT
Start: 2018-07-02 | End: 2018-07-23 | Stop reason: SDUPTHER

## 2018-07-09 ENCOUNTER — OFFICE VISIT (OUTPATIENT)
Dept: URGENT CARE | Facility: CLINIC | Age: 73
End: 2018-07-09
Payer: COMMERCIAL

## 2018-07-09 VITALS
TEMPERATURE: 99.1 F | RESPIRATION RATE: 16 BRPM | WEIGHT: 149 LBS | SYSTOLIC BLOOD PRESSURE: 100 MMHG | HEIGHT: 62 IN | BODY MASS INDEX: 27.42 KG/M2 | OXYGEN SATURATION: 94 % | HEART RATE: 73 BPM | DIASTOLIC BLOOD PRESSURE: 60 MMHG
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
## 2018-07-09 DIAGNOSIS — L03.114 CELLULITIS OF LEFT UPPER EXTREMITY: ICD-10-CM

## 2018-07-09 DIAGNOSIS — L30.9 DERMATITIS: ICD-10-CM

## 2018-07-09 PROCEDURE — 99214 OFFICE O/P EST MOD 30 MIN: CPT | Performed by: NURSE PRACTITIONER

## 2018-07-09 RX ORDER — SULFAMETHOXAZOLE AND TRIMETHOPRIM 800; 160 MG/1; MG/1
1 TABLET ORAL 2 TIMES DAILY
Qty: 10 TAB | Refills: 0 | Status: SHIPPED | OUTPATIENT
Start: 2018-07-09 | End: 2018-07-14

## 2018-07-09 RX ORDER — TRIAMCINOLONE ACETONIDE 1 MG/G
1 CREAM TOPICAL 2 TIMES DAILY
Qty: 1 TUBE | Refills: 0 | Status: SHIPPED | OUTPATIENT
Start: 2018-07-09 | End: 2018-07-16

## 2018-07-09 ASSESSMENT — ENCOUNTER SYMPTOMS
FEVER: 0
CHILLS: 0

## 2018-07-10 ENCOUNTER — PATIENT MESSAGE (OUTPATIENT)
Dept: CARDIOLOGY | Facility: MEDICAL CENTER | Age: 73
End: 2018-07-10

## 2018-07-10 NOTE — PROGRESS NOTES
"Subjective:      Alexsandra Woods is a 73 y.o. female who presents with Bug Bite (on head is draining white liquid x 1 day )     Past Medical History:   Diagnosis Date   • Allergic rhinitis due to pollen    • Anemia     from chemo   • Breast cancer (HCC)     right, chemotherapy   • Cancer (HCC) 2003    breast RIGHT SIDE   • Cataract     Fausto IOL   • High cholesterol    • HTN (hypertension)    • Hypothyroid    • Pneumonia 1969   • Rotator cuff disorder    • Shingles    • Tinnitus      Social History     Social History   • Marital status:      Spouse name: N/A   • Number of children: N/A   • Years of education: N/A     Occupational History   •  [employer]     Social History Main Topics   • Smoking status: Never Smoker   • Smokeless tobacco: Never Used   • Alcohol use 1.0 oz/week     2 Glasses of wine per week      Comment: Rare   • Drug use: No   • Sexual activity: Not Currently     Partners: Male     Other Topics Concern   • Not on file     Social History Narrative     with 2 children.     Family History   Problem Relation Age of Onset   • Stroke Mother 78   • Cancer Mother      Cervical   • Lung Disease Mother    • Hypertension Father    • Cancer Maternal Grandfather      Lung       Allergies: Lisinopril              Other   This is a new problem. The current episode started yesterday. The problem occurs constantly. The problem has been gradually worsening. Associated symptoms include a rash. Pertinent negatives include no chills or fever. Associated symptoms comments: Pain , rash, itching. Nothing aggravates the symptoms. The treatment provided no relief.       Review of Systems   Constitutional: Negative for chills, fever and malaise/fatigue.   Skin: Positive for itching and rash.   All other systems reviewed and are negative.         Objective:     /60   Pulse 73   Temp 37.3 °C (99.1 °F)   Resp 16   Ht 1.575 m (5' 2\")   Wt 67.6 kg (149 lb)   SpO2 94%   BMI 27.25 kg/m²   "     Physical Exam   Constitutional: She appears well-developed and well-nourished.   HENT:   Head:       Slight induration with puncture and STS.  Ulcerated area to the center with slight serosanguenous drainage.   Skin: Skin is warm and dry. Capillary refill takes less than 2 seconds. Rash noted.        Puncture to the posterior left upper arm with blistering, surrounding erythema and itching    Psychiatric: She has a normal mood and affect. Her behavior is normal. Judgment and thought content normal.   Vitals reviewed.              Assessment/Plan:     1. Insect bite, initial encounter  - triamcinolone acetonide (KENALOG) 0.1 % Cream; Apply 1 Application to affected area(s) 2 times a day for 7 days.  Dispense: 1 Tube; Refill: 0    2. Cellulitis of left upper extremity    - sulfamethoxazole-trimethoprim (BACTRIM DS) 800-160 MG tablet; Take 1 Tab by mouth 2 times a day for 5 days.  Dispense: 10 Tab; Refill: 0  -Warm compresses  -Follow up for persistent or worsening of symptoms.    3. Dermatitis    - triamcinolone acetonide (KENALOG) 0.1 % Cream; Apply 1 Application to affected area(s) 2 times a day for 7 days.  Dispense: 1 Tube; Refill: 0

## 2018-07-23 DIAGNOSIS — I10 ESSENTIAL HYPERTENSION: ICD-10-CM

## 2018-07-23 RX ORDER — LOSARTAN POTASSIUM 25 MG/1
12.5 TABLET ORAL DAILY
Qty: 90 TAB | Refills: 3 | COMMUNITY
Start: 2018-07-23 | End: 2019-09-18 | Stop reason: SDUPTHER

## 2018-08-15 ENCOUNTER — PATIENT MESSAGE (OUTPATIENT)
Dept: CARDIOLOGY | Facility: MEDICAL CENTER | Age: 73
End: 2018-08-15

## 2018-08-16 NOTE — TELEPHONE ENCOUNTER
Called pt to follow up on message. S/w pt's  who wrote the [employer] message. Educated him that losartan is not part of the recall and informed them that it is Valsartan. He is appreciative of information.   He also notes that he is having a difficult time getting pt's triamterene/hctz 37.5-25 mg refilled by her PCP. States that he had called them this morning but have not heard anything back. Reassured him that he call follow up with us tomorrow and we can submit the refill due to pt taking for BP.  reports that the swelling pt noticed is getting better. Educated him to please make sure if she develops and SOB or facial swelling to stop taking the losartan, take a benadryl and follow up with office or go straight to the ER. He states verbal understanding.

## 2018-09-25 ENCOUNTER — OFFICE VISIT (OUTPATIENT)
Dept: CARDIOLOGY | Facility: MEDICAL CENTER | Age: 73
End: 2018-09-25
Payer: COMMERCIAL

## 2018-09-25 VITALS
SYSTOLIC BLOOD PRESSURE: 126 MMHG | HEART RATE: 66 BPM | OXYGEN SATURATION: 98 % | BODY MASS INDEX: 28.01 KG/M2 | WEIGHT: 152.2 LBS | DIASTOLIC BLOOD PRESSURE: 80 MMHG | HEIGHT: 62 IN

## 2018-09-25 DIAGNOSIS — Z13.220 SCREENING CHOLESTEROL LEVEL: ICD-10-CM

## 2018-09-25 DIAGNOSIS — I10 ESSENTIAL HYPERTENSION: ICD-10-CM

## 2018-09-25 PROCEDURE — 99214 OFFICE O/P EST MOD 30 MIN: CPT | Performed by: INTERNAL MEDICINE

## 2018-09-25 RX ORDER — SODIUM FLUORIDE 1.1 G/100G
CREAM ORAL
Refills: 3 | COMMUNITY
Start: 2018-07-11 | End: 2021-10-28

## 2018-09-25 ASSESSMENT — ENCOUNTER SYMPTOMS
BRUISES/BLEEDS EASILY: 0
PND: 0
COUGH: 0
ORTHOPNEA: 0
SHORTNESS OF BREATH: 0
DIZZINESS: 0
DEPRESSION: 0
LOSS OF CONSCIOUSNESS: 0
PALPITATIONS: 0
ABDOMINAL PAIN: 0
FALLS: 0

## 2018-09-25 NOTE — PROGRESS NOTES
Subjective:   Alexsandra Woosd is a 73 y.o. female presenting to clinic for follow-up on her hypertension.    Pertinent history:  Hypertension  History of breast cancer in 2003 status post mastectomy and chemotherapy.  No radiation.  Hypothyroidism    Patient is not feeling really well.  Her cough has completely resolved since her ACE inhibitor was stopped.  Her blood pressures have been well controlled.  She has been compliant with her medications.  Her palpitations have now resolved.  She was recently found to be hyperthyroid and her levothyroxine dose is currently being adjusted.    She works in the [employer] and has a very stressful job but appears to be coping well with it recently.    Past Medical History:   Diagnosis Date   • Allergic rhinitis due to pollen    • Anemia     from chemo   • Breast cancer (HCC)     right, chemotherapy   • Cancer (HCC) 2003    breast RIGHT SIDE   • Cataract     Fausto IOL   • High cholesterol    • HTN (hypertension)    • Hypothyroid    • Pneumonia 1969   • Rotator cuff disorder    • Shingles    • Tinnitus      Past Surgical History:   Procedure Laterality Date   • CARPAL TUNNEL RELEASE Left 10/3/2017    Procedure: CARPAL TUNNEL RELEASE;  Surgeon: Sarita Madrid M.D.;  Location: SURGERY [facility];  Service: Orthopedics   • CATARACT EXTRACTION WITH IOL Bilateral 7/2015   • CARPAL TUNNEL RELEASE  2015   • LAMINOTOMY  11/18/2009    LEFT Performed by SARITA BURKS at SURGERY [facility]   • ROTATOR CUFF REPAIR  2/5/2009    Performed by OSGOOD, PATRICK J at [facility]   • SHOULDER DECOMPRESSION  2/5/2009    Performed by OSGOOD, PATRICK J at [facility]   • ROTATOR CUFF REPAIR Right 2009   • KNEE ARTHROSCOPY Right 2005   • BREAST RECONSTRUCTION  6/2004   • MASTECTOMY Bilateral 2003     excision lymph nodes right axillary   • CATH, PORT-A-CATH  2003   • BREAST IMPLANT REMOVAL      expanders removed   • CYST EXCISION     •  LYMPH NODE EXCISION Right     arm   • OTHER SURGICAL PROCEDURE      9/2003 port placed, 3/2004 port removed     Family History   Problem Relation Age of Onset   • Stroke Mother 78   • Cancer Mother         Cervical   • Lung Disease Mother    • Hypertension Father    • Cancer Maternal Grandfather         Lung     History   Smoking Status   • Never Smoker   Smokeless Tobacco   • Never Used     Occasional alcohol. No recreational drug use.    Allergies   Allergen Reactions   • Lisinopril Cough     Outpatient Encounter Prescriptions as of 9/25/2018   Medication Sig Dispense Refill   • DENTA 5000 PLUS 1.1 % Cream USE AS REGULAR TOOTHPASTE. SPIT OUT BUT DO NOT RINSE. NO FOOD OR DRINK FOR 30 MIN. AFTERWARDS.  3   • losartan (COZAAR) 25 MG Tab Take 0.5 Tabs by mouth every day. 90 Tab 3   • levothyroxine (SYNTHROID) 88 MCG Tab TAKE 1 TABLET BY MOUTH DAILY  3   • ascorbic acid (VITAMIN C) 100 MG tablet Take 1,000 mg by mouth every day.     • cholecalciferol (VITAMIN D3) 5000 UNIT Cap Take 5,000 Units by mouth every day.     • B Complex-C (SUPER B COMPLEX PO) Take 1 Tab by mouth every day.     • Glucosamine HCl (GLUCOSAMINE PO) Take 1 Tab by mouth every day.     • multivitamin (THERAGRAN) Tab Take 1 Tab by mouth every day.     • triamterene/hctz (MAXZIDE-25/DYAZIDE) 37.5-25 MG CAPS Take 1 Cap by mouth every morning. 30 Cap 0     No facility-administered encounter medications on file as of 9/25/2018.      Review of Systems   Constitutional: Negative for malaise/fatigue.   Respiratory: Negative for cough and shortness of breath.    Cardiovascular: Negative for chest pain, palpitations, orthopnea, leg swelling and PND.   Gastrointestinal: Negative for abdominal pain.   Musculoskeletal: Negative for falls.   Neurological: Negative for dizziness and loss of consciousness.   Endo/Heme/Allergies: Does not bruise/bleed easily.   Psychiatric/Behavioral: Negative for depression.   All other systems reviewed and are negative.        "Objective:   /80 (BP Location: Left arm, Patient Position: Sitting, BP Cuff Size: Adult)   Pulse 66   Ht 1.575 m (5' 2\")   Wt 69 kg (152 lb 3.2 oz)   SpO2 98%   BMI 27.84 kg/m²     Physical Exam   Constitutional: She is oriented to person, place, and time. No distress.   HENT:   Head: Normocephalic and atraumatic.   Eyes: Conjunctivae are normal.   Neck: Normal range of motion. Neck supple. No JVD present.   Cardiovascular: Normal rate, regular rhythm and normal heart sounds.  Exam reveals no gallop and no friction rub.    No murmur heard.  Pulmonary/Chest: Effort normal and breath sounds normal. No respiratory distress. She has no wheezes. She has no rales.   Abdominal: Soft. There is no tenderness.   Musculoskeletal: She exhibits no edema.   Neurological: She is alert and oriented to person, place, and time.   Skin: Skin is warm and dry. She is not diaphoretic.   Psychiatric: She has a normal mood and affect.   Nursing note and vitals reviewed.    Holter monitor from August 2017 showed normal sinus rhythm.  No significant arrhythmias noted.    Assessment:     1. Essential hypertension  BASIC METABOLIC PANEL   2. Screening cholesterol level  LIPID PROFILE       Medical Decision Making:  Today's Assessment / Status / Plan:     Hypertension: Blood pressure is at goal.  Continue hydrochlorothiazide, triamterene and losartan at current dose.  Chem-7 ordered today to evaluate renal function.    Dry cough: Now resolved.  Likely ACE inhibitor induced.    Palpitations: Patient denies any recurrence.  Her thyroid levels were recently elevated.  Her levothyroxine dose was recently decreased.    Screening for cholesterol ordered today.    Return to clinic in 1 year or earlier if needed.    Thank you for allowing me to participate in the care of this patient. Please do not hesitate to contact me with any questions.    Elizabeth Watts MD  Cardiologist  [facility]      PLEASE NOTE: " This dictation was created using voice recognition software.

## 2018-09-25 NOTE — LETTER
[facility]   [address] [place] [address]  JOAQU[place] Ramos [address]  Phone: [phone]  Fax: [phone]              Alexsandra Woods  1945    Encounter Date: 9/25/2018    Elizabeth Watts M.D.          PROGRESS NOTE:  Subjective:   Alexsandra Woods is a 73 y.o. female presenting to clinic for follow-up on her hypertension.    Pertinent history:  Hypertension  History of breast cancer in 2003 status post mastectomy and chemotherapy.  No radiation.  Hypothyroidism    Patient is not feeling really well.  Her cough has completely resolved since her ACE inhibitor was stopped.  Her blood pressures have been well controlled.  She has been compliant with her medications.  Her palpitations have now resolved.  She was recently found to be hyperthyroid and her levothyroxine dose is currently being adjusted.    She works in the [employer] and has a very stressful job but appears to be coping well with it recently.    Past Medical History:   Diagnosis Date   • Allergic rhinitis due to pollen    • Anemia     from chemo   • Breast cancer (HCC)     right, chemotherapy   • Cancer (HCC) 2003    breast RIGHT SIDE   • Cataract     Fausto IOL   • High cholesterol    • HTN (hypertension)    • Hypothyroid    • Pneumonia 1969   • Rotator cuff disorder    • Shingles    • Tinnitus      Past Surgical History:   Procedure Laterality Date   • CARPAL TUNNEL RELEASE Left 10/3/2017    Procedure: CARPAL TUNNEL RELEASE;  Surgeon: Sarita Madrid M.D.;  Location: SURGERY [facility];  Service: Orthopedics   • CATARACT EXTRACTION WITH IOL Bilateral 7/2015   • CARPAL TUNNEL RELEASE  2015   • LAMINOTOMY  11/18/2009    LEFT Performed by SARITA BURKS at SURGERY [facility]   • ROTATOR CUFF REPAIR  2/5/2009    Performed by OSGOOD, PATRICK J at [facility]   • SHOULDER DECOMPRESSION  2/5/2009    Performed by OSGOOD, PATRICK J at [facility]   • ROTATOR  CUFF REPAIR Right 2009   • KNEE ARTHROSCOPY Right 2005   • BREAST RECONSTRUCTION  6/2004   • MASTECTOMY Bilateral 2003     excision lymph nodes right axillary   • CATH, PORT-A-CATH  2003   • BREAST IMPLANT REMOVAL      expanders removed   • CYST EXCISION     • LYMPH NODE EXCISION Right     arm   • OTHER SURGICAL PROCEDURE      9/2003 port placed, 3/2004 port removed     Family History   Problem Relation Age of Onset   • Stroke Mother 78   • Cancer Mother         Cervical   • Lung Disease Mother    • Hypertension Father    • Cancer Maternal Grandfather         Lung     History   Smoking Status   • Never Smoker   Smokeless Tobacco   • Never Used     Occasional alcohol. No recreational drug use.    Allergies   Allergen Reactions   • Lisinopril Cough     Outpatient Encounter Prescriptions as of 9/25/2018   Medication Sig Dispense Refill   • DENTA 5000 PLUS 1.1 % Cream USE AS REGULAR TOOTHPASTE. SPIT OUT BUT DO NOT RINSE. NO FOOD OR DRINK FOR 30 MIN. AFTERWARDS.  3   • losartan (COZAAR) 25 MG Tab Take 0.5 Tabs by mouth every day. 90 Tab 3   • levothyroxine (SYNTHROID) 88 MCG Tab TAKE 1 TABLET BY MOUTH DAILY  3   • ascorbic acid (VITAMIN C) 100 MG tablet Take 1,000 mg by mouth every day.     • cholecalciferol (VITAMIN D3) 5000 UNIT Cap Take 5,000 Units by mouth every day.     • B Complex-C (SUPER B COMPLEX PO) Take 1 Tab by mouth every day.     • Glucosamine HCl (GLUCOSAMINE PO) Take 1 Tab by mouth every day.     • multivitamin (THERAGRAN) Tab Take 1 Tab by mouth every day.     • triamterene/hctz (MAXZIDE-25/DYAZIDE) 37.5-25 MG CAPS Take 1 Cap by mouth every morning. 30 Cap 0     No facility-administered encounter medications on file as of 9/25/2018.      Review of Systems   Constitutional: Negative for malaise/fatigue.   Respiratory: Negative for cough and shortness of breath.    Cardiovascular: Negative for chest pain, palpitations, orthopnea, leg swelling and PND.   Gastrointestinal: Negative for abdominal pain.    "  Musculoskeletal: Negative for falls.   Neurological: Negative for dizziness and loss of consciousness.   Endo/Heme/Allergies: Does not bruise/bleed easily.   Psychiatric/Behavioral: Negative for depression.   All other systems reviewed and are negative.       Objective:   /80 (BP Location: Left arm, Patient Position: Sitting, BP Cuff Size: Adult)   Pulse 66   Ht 1.575 m (5' 2\")   Wt 69 kg (152 lb 3.2 oz)   SpO2 98%   BMI 27.84 kg/m²      Physical Exam   Constitutional: She is oriented to person, place, and time. No distress.   HENT:   Head: Normocephalic and atraumatic.   Eyes: Conjunctivae are normal.   Neck: Normal range of motion. Neck supple. No JVD present.   Cardiovascular: Normal rate, regular rhythm and normal heart sounds.  Exam reveals no gallop and no friction rub.    No murmur heard.  Pulmonary/Chest: Effort normal and breath sounds normal. No respiratory distress. She has no wheezes. She has no rales.   Abdominal: Soft. There is no tenderness.   Musculoskeletal: She exhibits no edema.   Neurological: She is alert and oriented to person, place, and time.   Skin: Skin is warm and dry. She is not diaphoretic.   Psychiatric: She has a normal mood and affect.   Nursing note and vitals reviewed.    Holter monitor from August 2017 showed normal sinus rhythm.  No significant arrhythmias noted.    Assessment:     1. Essential hypertension  BASIC METABOLIC PANEL   2. Screening cholesterol level  LIPID PROFILE       Medical Decision Making:  Today's Assessment / Status / Plan:     Hypertension: Blood pressure is at goal.  Continue hydrochlorothiazide, triamterene and losartan at current dose.  Chem-7 ordered today to evaluate renal function.    Dry cough: Now resolved.  Likely ACE inhibitor induced.    Palpitations: Patient denies any recurrence.  Her thyroid levels were recently elevated.  Her levothyroxine dose was recently decreased.    Screening for cholesterol ordered today.    Return to clinic " in 1 year or earlier if needed.    Thank you for allowing me to participate in the care of this patient. Please do not hesitate to contact me with any questions.    Elizabeth Watts MD  Cardiologist  [facility]      PLEASE NOTE: This dictation was created using voice recognition software.         Adalberto Paris M.D.  [address] [place] [id number]  VIA Facsimile: [phone]

## 2018-11-16 ENCOUNTER — HOSPITAL ENCOUNTER (OUTPATIENT)
Facility: MEDICAL CENTER | Age: 73
End: 2018-11-16
Attending: NURSE PRACTITIONER
Payer: COMMERCIAL

## 2018-11-16 ENCOUNTER — OFFICE VISIT (OUTPATIENT)
Dept: URGENT CARE | Facility: CLINIC | Age: 73
End: 2018-11-16
Payer: COMMERCIAL

## 2018-11-16 VITALS
TEMPERATURE: 98.6 F | HEIGHT: 62 IN | SYSTOLIC BLOOD PRESSURE: 96 MMHG | BODY MASS INDEX: 26.68 KG/M2 | OXYGEN SATURATION: 95 % | DIASTOLIC BLOOD PRESSURE: 56 MMHG | HEART RATE: 83 BPM | RESPIRATION RATE: 16 BRPM | WEIGHT: 145 LBS

## 2018-11-16 DIAGNOSIS — R07.0 THROAT PAIN: ICD-10-CM

## 2018-11-16 DIAGNOSIS — K12.1 STOMATITIS: Primary | ICD-10-CM

## 2018-11-16 LAB
INT CON NEG: NEGATIVE
INT CON POS: POSITIVE
S PYO AG THROAT QL: NEGATIVE

## 2018-11-16 PROCEDURE — 99214 OFFICE O/P EST MOD 30 MIN: CPT | Performed by: NURSE PRACTITIONER

## 2018-11-16 PROCEDURE — 87880 STREP A ASSAY W/OPTIC: CPT | Performed by: NURSE PRACTITIONER

## 2018-11-16 PROCEDURE — 87070 CULTURE OTHR SPECIMN AEROBIC: CPT

## 2018-11-16 RX ORDER — CHLORHEXIDINE GLUCONATE ORAL RINSE 1.2 MG/ML
15 SOLUTION DENTAL 2 TIMES DAILY
Qty: 210 ML | Refills: 0 | Status: SHIPPED | OUTPATIENT
Start: 2018-11-16 | End: 2018-11-23

## 2018-11-17 ASSESSMENT — ENCOUNTER SYMPTOMS
RESPIRATORY NEGATIVE: 1
SHORTNESS OF BREATH: 0
CARDIOVASCULAR NEGATIVE: 1
EYES NEGATIVE: 1
CHILLS: 0
CONSTITUTIONAL NEGATIVE: 1
SORE THROAT: 1
FEVER: 0

## 2018-11-17 NOTE — PROGRESS NOTES
"Subjective:   Alexsandra Woods is a 73 y.o. female who presents for Oral Pain (x2 days swelling on right side of tongue)        Oral Pain   This is a new problem. Episode onset: 2 days ago. The problem has been gradually worsening. Associated symptoms include a sore throat. Pertinent negatives include no chest pain, chills or fever. Associated symptoms comments: Denies recent antibiotic use. The symptoms are aggravated by eating and swallowing. She has tried NSAIDs for the symptoms. The treatment provided mild relief.     Review of Systems   Constitutional: Negative.  Negative for chills and fever.   HENT: Positive for sore throat.    Eyes: Negative.    Respiratory: Negative.  Negative for shortness of breath.    Cardiovascular: Negative.  Negative for chest pain.   Skin: Negative.    All other systems reviewed and are negative.    Allergies   Allergen Reactions   • Lisinopril Cough      Objective:   BP (!) 96/56   Pulse 83   Temp 37 °C (98.6 °F)   Resp 16   Ht 1.575 m (5' 2\")   Wt 65.8 kg (145 lb)   SpO2 95%   BMI 26.52 kg/m²   Physical Exam   Constitutional: She is oriented to person, place, and time. She appears well-developed and well-nourished. No distress.   HENT:   Right Ear: External ear normal.   Left Ear: External ear normal.   Mouth/Throat: Mucous membranes are normal. No oropharyngeal exudate or posterior oropharyngeal edema. Tonsils are 1+ on the right. Tonsils are 1+ on the left.   Eyes: Pupils are equal, round, and reactive to light. Conjunctivae and EOM are normal.   Neck: Normal range of motion.   Cardiovascular: Normal rate, regular rhythm, normal heart sounds and intact distal pulses.    Pulmonary/Chest: Effort normal and breath sounds normal. No respiratory distress.   Abdominal: Soft.   Musculoskeletal: Normal range of motion.   Neurological: She is alert and oriented to person, place, and time.   Skin: Skin is warm and dry. Capillary refill takes less than 2 seconds.   Psychiatric: " She has a normal mood and affect.   Vitals reviewed.        Assessment/Plan:   1. Stomatitis  - chlorhexidine (PERIDEX) 0.12 % Solution; Take 15 mL by mouth 2 times a day for 7 days.  Dispense: 210 mL; Refill: 0    2. Throat pain  - CULTURE THROAT; Future  - POCT Rapid Strep A    Differential diagnosis, natural history, supportive care, and indications for immediate follow-up discussed.    Return for 1) Symptoms that change or worsen, or go to the ER, 2) Follow up with primary care.    All questions answered. Patient agrees with the plan of care.

## 2018-11-18 LAB
BACTERIA SPEC RESP CULT: NORMAL
SIGNIFICANT IND 70042: NORMAL
SITE SITE: NORMAL
SOURCE SOURCE: NORMAL

## 2019-08-16 ENCOUNTER — HOSPITAL ENCOUNTER (OUTPATIENT)
Dept: RADIOLOGY | Facility: MEDICAL CENTER | Age: 74
End: 2019-08-16
Attending: FAMILY MEDICINE
Payer: COMMERCIAL

## 2019-08-16 DIAGNOSIS — Z00.00 ENCOUNTER FOR GENERAL ADULT MEDICAL EXAMINATION WITHOUT ABNORMAL FINDINGS: ICD-10-CM

## 2019-08-16 PROCEDURE — 77080 DXA BONE DENSITY AXIAL: CPT

## 2019-08-17 DIAGNOSIS — I10 ESSENTIAL HYPERTENSION: ICD-10-CM

## 2019-08-19 ENCOUNTER — TELEPHONE (OUTPATIENT)
Dept: CARDIOLOGY | Facility: MEDICAL CENTER | Age: 74
End: 2019-08-19

## 2019-08-19 NOTE — TELEPHONE ENCOUNTER
Called patient in regards to standing lab order. Spoke to patient's  Nikhil who gave me an alternative phone# to call her at ([phone]). Spoke to patient regarding labs will go in before upcoming appt. To have labs done @[facility].

## 2019-08-20 RX ORDER — LOSARTAN POTASSIUM 25 MG/1
TABLET ORAL
Qty: 90 TAB | Refills: 3 | Status: SHIPPED | OUTPATIENT
Start: 2019-08-20 | End: 2019-09-18

## 2019-09-07 ENCOUNTER — HOSPITAL ENCOUNTER (OUTPATIENT)
Dept: LAB | Facility: MEDICAL CENTER | Age: 74
End: 2019-09-07
Attending: INTERNAL MEDICINE
Payer: COMMERCIAL

## 2019-09-07 DIAGNOSIS — I10 ESSENTIAL HYPERTENSION: ICD-10-CM

## 2019-09-07 DIAGNOSIS — Z13.220 SCREENING CHOLESTEROL LEVEL: ICD-10-CM

## 2019-09-07 LAB
ANION GAP SERPL CALC-SCNC: 8 MMOL/L (ref 0–11.9)
BUN SERPL-MCNC: 25 MG/DL (ref 8–22)
CALCIUM SERPL-MCNC: 10.1 MG/DL (ref 8.5–10.5)
CHLORIDE SERPL-SCNC: 104 MMOL/L (ref 96–112)
CHOLEST SERPL-MCNC: 179 MG/DL (ref 100–199)
CO2 SERPL-SCNC: 29 MMOL/L (ref 20–33)
CREAT SERPL-MCNC: 1.07 MG/DL (ref 0.5–1.4)
FASTING STATUS PATIENT QL REPORTED: NORMAL
GLUCOSE SERPL-MCNC: 88 MG/DL (ref 65–99)
HDLC SERPL-MCNC: 66 MG/DL
LDLC SERPL CALC-MCNC: 104 MG/DL
POTASSIUM SERPL-SCNC: 3.8 MMOL/L (ref 3.6–5.5)
SODIUM SERPL-SCNC: 141 MMOL/L (ref 135–145)
TRIGL SERPL-MCNC: 47 MG/DL (ref 0–149)

## 2019-09-07 PROCEDURE — 80048 BASIC METABOLIC PNL TOTAL CA: CPT

## 2019-09-07 PROCEDURE — 36415 COLL VENOUS BLD VENIPUNCTURE: CPT

## 2019-09-07 PROCEDURE — 80061 LIPID PANEL: CPT

## 2019-09-18 ENCOUNTER — OFFICE VISIT (OUTPATIENT)
Dept: CARDIOLOGY | Facility: MEDICAL CENTER | Age: 74
End: 2019-09-18
Payer: COMMERCIAL

## 2019-09-18 VITALS
BODY MASS INDEX: 21.53 KG/M2 | SYSTOLIC BLOOD PRESSURE: 112 MMHG | HEART RATE: 66 BPM | DIASTOLIC BLOOD PRESSURE: 60 MMHG | OXYGEN SATURATION: 94 % | WEIGHT: 117 LBS | HEIGHT: 62 IN

## 2019-09-18 DIAGNOSIS — I10 ESSENTIAL HYPERTENSION: ICD-10-CM

## 2019-09-18 DIAGNOSIS — Z79.899 ENCOUNTER FOR LONG-TERM (CURRENT) USE OF HIGH-RISK MEDICATION: ICD-10-CM

## 2019-09-18 PROCEDURE — 99213 OFFICE O/P EST LOW 20 MIN: CPT | Performed by: INTERNAL MEDICINE

## 2019-09-18 RX ORDER — TRIAMTERENE AND HYDROCHLOROTHIAZIDE 37.5; 25 MG/1; MG/1
1 CAPSULE ORAL EVERY MORNING
Qty: 90 CAP | Refills: 3 | Status: SHIPPED | OUTPATIENT
Start: 2019-09-18 | End: 2020-08-21 | Stop reason: SDUPTHER

## 2019-09-18 RX ORDER — LOSARTAN POTASSIUM 25 MG/1
12.5 TABLET ORAL DAILY
Qty: 45 TAB | Refills: 3 | Status: SHIPPED | OUTPATIENT
Start: 2019-09-18 | End: 2020-08-21 | Stop reason: SDUPTHER

## 2019-09-18 RX ORDER — TRIAMCINOLONE ACETONIDE 1 MG/G
CREAM TOPICAL
Refills: 0 | COMMUNITY
Start: 2019-09-10 | End: 2020-07-03 | Stop reason: SDUPTHER

## 2019-09-18 RX ORDER — ZOSTER VACCINE RECOMBINANT, ADJUVANTED 50 MCG/0.5
KIT INTRAMUSCULAR
Refills: 0 | COMMUNITY
Start: 2019-07-27 | End: 2019-09-18

## 2019-09-18 ASSESSMENT — ENCOUNTER SYMPTOMS
PALPITATIONS: 0
ORTHOPNEA: 0
SHORTNESS OF BREATH: 0
ABDOMINAL PAIN: 0
BRUISES/BLEEDS EASILY: 0
DEPRESSION: 0
DIZZINESS: 0
FALLS: 0
LOSS OF CONSCIOUSNESS: 0
PND: 0

## 2019-09-18 NOTE — PROGRESS NOTES
Subjective:   Alexsandra Woods is a 74-year-old female presenting for follow-up on hypertension.    Pertinent history:  Hypertension  History of breast cancer in 2003 status post mastectomy and chemotherapy.  No radiation.  Hypothyroidism    Patient has been feeling well overall.  Her  had multiple questions today regarding her antihypertensives.  Her blood pressures have been well controlled.    Earlier this year, she presented to the doctor at [place] where she was found to have severe hyperglycemia.  She was started on metformin which she did not tolerate patient's then she has been watching her diet and using supplements to help.  She has lost over 30 pounds and is feeling really well.    She works in the [employer] and has a very stressful job but appears to be coping well with it recently.    Past Medical History:   Diagnosis Date   • Allergic rhinitis due to pollen    • Anemia     from chemo   • Breast cancer (HCC)     right, chemotherapy   • Cancer (HCC) 2003    breast RIGHT SIDE   • Cataract     Fausto IOL   • High cholesterol    • HTN (hypertension)    • Hypothyroid    • Pneumonia 1969   • Rotator cuff disorder    • Shingles    • Tinnitus      Past Surgical History:   Procedure Laterality Date   • CARPAL TUNNEL RELEASE Left 10/3/2017    Procedure: CARPAL TUNNEL RELEASE;  Surgeon: Sarita Madrid M.D.;  Location: SURGERY [facility];  Service: Orthopedics   • CATARACT EXTRACTION WITH IOL Bilateral 7/2015   • CARPAL TUNNEL RELEASE  2015   • LAMINOTOMY  11/18/2009    LEFT Performed by SARITA BURKS at SURGERY [facility]   • ROTATOR CUFF REPAIR  2/5/2009    Performed by OSGOOD, PATRICK J at [facility]   • SHOULDER DECOMPRESSION  2/5/2009    Performed by OSGOOD, PATRICK J at [facility]   • ROTATOR CUFF REPAIR Right 2009   • KNEE ARTHROSCOPY Right 2005   • BREAST RECONSTRUCTION  6/2004   • MASTECTOMY Bilateral 2003     excision lymph nodes  right axillary   • CATH, PORT-A-CATH  2003   • BREAST IMPLANT REMOVAL      expanders removed   • CYST EXCISION     • LYMPH NODE EXCISION Right     arm   • OTHER SURGICAL PROCEDURE      9/2003 port placed, 3/2004 port removed     Family History   Problem Relation Age of Onset   • Stroke Mother 78   • Cancer Mother         Cervical   • Lung Disease Mother    • Hypertension Father    • Cancer Maternal Grandfather         Lung     Social History     Tobacco Use   Smoking Status Never Smoker   Smokeless Tobacco Never Used     Social history reviewed.  No changes from prior note on 9/25/2018.  No smoking.    Allergies   Allergen Reactions   • Lisinopril Cough     Outpatient Encounter Medications as of 9/18/2019   Medication Sig Dispense Refill   • triamcinolone acetonide (KENALOG) 0.1 % Cream APPLY SPARINGLY TOPICAL 2 TIMES A DAY AS NEEDED  0   • losartan (COZAAR) 25 MG Tab Take 0.5 Tabs by mouth every day. 45 Tab 3   • triamterene/hctz (MAXZIDE-25/DYAZIDE) 37.5-25 MG Cap Take 1 Cap by mouth every morning. 90 Cap 3   • DENTA 5000 PLUS 1.1 % Cream USE AS REGULAR TOOTHPASTE. SPIT OUT BUT DO NOT RINSE. NO FOOD OR DRINK FOR 30 MIN. AFTERWARDS.  3   • levothyroxine (SYNTHROID) 88 MCG Tab TAKE 1 TABLET BY MOUTH DAILY  3   • ascorbic acid (VITAMIN C) 100 MG tablet Take 1,000 mg by mouth every day.     • cholecalciferol (VITAMIN D3) 5000 UNIT Cap Take 5,000 Units by mouth every day.     • B Complex-C (SUPER B COMPLEX PO) Take 1 Tab by mouth every day.     • Glucosamine HCl (GLUCOSAMINE PO) Take 1 Tab by mouth every day.     • multivitamin (THERAGRAN) Tab Take 1 Tab by mouth every day.     • [DISCONTINUED] SHINGRIX 50 MCG/0.5ML Recon Susp TO BE ADMINISTERED BY PHARMACIST FOR IMMUNIZATION  0   • [DISCONTINUED] losartan (COZAAR) 25 MG Tab TAKE 1 TABLET BY MOUTH  EVERY DAY (Patient not taking: Reported on 9/18/2019) 90 Tab 3   • [DISCONTINUED] losartan (COZAAR) 25 MG Tab Take 0.5 Tabs by mouth every day. 90 Tab 3   • [DISCONTINUED]  "triamterene/hctz (MAXZIDE-25/DYAZIDE) 37.5-25 MG CAPS Take 1 Cap by mouth every morning. 30 Cap 0     No facility-administered encounter medications on file as of 9/18/2019.      Review of Systems   Constitutional: Negative for malaise/fatigue.   Respiratory: Negative for shortness of breath.    Cardiovascular: Negative for chest pain, palpitations, orthopnea, leg swelling and PND.   Gastrointestinal: Negative for abdominal pain.   Musculoskeletal: Negative for falls.   Neurological: Negative for dizziness and loss of consciousness.   Endo/Heme/Allergies: Does not bruise/bleed easily.   Psychiatric/Behavioral: Negative for depression.   All other systems reviewed and are negative.       Objective:   /60 (BP Location: Left arm, Patient Position: Sitting, BP Cuff Size: Adult)   Pulse 66   Ht 1.575 m (5' 2\")   Wt 53.1 kg (117 lb)   SpO2 94%   BMI 21.40 kg/m²     Physical Exam   Constitutional: She is oriented to person, place, and time. She appears well-developed and well-nourished. No distress.   HENT:   Head: Normocephalic and atraumatic.   Eyes: Conjunctivae are normal.   Neck: Normal range of motion. Neck supple. No JVD present.   Cardiovascular: Normal rate, regular rhythm and normal heart sounds. Exam reveals no gallop and no friction rub.   No murmur heard.  Pulmonary/Chest: Effort normal and breath sounds normal. No respiratory distress. She has no wheezes. She has no rales.   Abdominal: Soft. There is no tenderness.   Musculoskeletal: She exhibits no edema.   Neurological: She is alert and oriented to person, place, and time.   Skin: Skin is warm and dry. She is not diaphoretic.   Psychiatric: She has a normal mood and affect.   Nursing note and vitals reviewed.    Holter monitor from August 2017 showed normal sinus rhythm.  No significant arrhythmias noted.    Labs performed in September 2019 were reviewed and showed normal creatinine and potassium.  .    Assessment:     1. Essential " hypertension  losartan (COZAAR) 25 MG Tab   2. Encounter for long-term (current) use of high-risk medication         Medical Decision Making:  Today's Assessment / Status / Plan:     Her blood pressures are well controlled.  Continue triamterene, hydrochlorothiazide and losartan at current dose.  Her renal functions normal.    Total 16 minutes face-to-face time spent with patient, with greater than 50% of the total time discussing patient's issues and symptoms as listed above in assessment and plan, as well as managing coordination of care for future evaluation and treatment. Most of the time was spent discussing management of her hypertension along with reviewing each of her medications with the patient and her .     Return to clinic in 1 year or earlier if needed.    Thank you for allowing me to participate in the care of this patient. Please do not hesitate to contact me with any questions.    Elizabeth Watts MD  Cardiologist  [facility]      PLEASE NOTE: This dictation was created using voice recognition software.

## 2019-09-18 NOTE — LETTER
[facility]   [address] [place] [address]  JOA[place] Ramos [address]  Phone: [phone]  Fax: [phone]              Alexsandra Woods  1945    Encounter Date: 9/18/2019    Elizabeth Watts M.D.          PROGRESS NOTE:  Subjective:   Alexsandra Woods is a 74-year-old female presenting for follow-up on hypertension.    Pertinent history:  Hypertension  History of breast cancer in 2003 status post mastectomy and chemotherapy.  No radiation.  Hypothyroidism    Patient has been feeling well overall.  Her  had multiple questions today regarding her antihypertensives.  Her blood pressures have been well controlled.    Earlier this year, she presented to the doctor at [place] where she was found to have severe hyperglycemia.  She was started on metformin which she did not tolerate patient's then she has been watching her diet and using supplements to help.  She has lost over 30 pounds and is feeling really well.    She works in the [employer] department and has a very stressful job but appears to be coping well with it recently.    Past Medical History:   Diagnosis Date   • Allergic rhinitis due to pollen    • Anemia     from chemo   • Breast cancer (HCC)     right, chemotherapy   • Cancer (HCC) 2003    breast RIGHT SIDE   • Cataract     Fausto IOL   • High cholesterol    • HTN (hypertension)    • Hypothyroid    • Pneumonia 1969   • Rotator cuff disorder    • Shingles    • Tinnitus      Past Surgical History:   Procedure Laterality Date   • CARPAL TUNNEL RELEASE Left 10/3/2017    Procedure: CARPAL TUNNEL RELEASE;  Surgeon: Sarita Madrid M.D.;  Location: SURGERY [facility];  Service: Orthopedics   • CATARACT EXTRACTION WITH IOL Bilateral 7/2015   • CARPAL TUNNEL RELEASE  2015   • LAMINOTOMY  11/18/2009    LEFT Performed by SARITA BURKS at [facility]   • ROTATOR CUFF REPAIR  2/5/2009    Performed by OSGOOD, PATRICK J at [facility]   • SHOULDER DECOMPRESSION  2/5/2009    Performed by OSGOOD, PATRICK J at SURGERY [facility]   • ROTATOR CUFF REPAIR Right 2009   • KNEE ARTHROSCOPY Right 2005   • BREAST RECONSTRUCTION  6/2004   • MASTECTOMY Bilateral 2003     excision lymph nodes right axillary   • CATH, PORT-A-CATH  2003   • BREAST IMPLANT REMOVAL      expanders removed   • CYST EXCISION     • LYMPH NODE EXCISION Right     arm   • OTHER SURGICAL PROCEDURE      9/2003 port placed, 3/2004 port removed     Family History   Problem Relation Age of Onset   • Stroke Mother 78   • Cancer Mother         Cervical   • Lung Disease Mother    • Hypertension Father    • Cancer Maternal Grandfather         Lung     Social History     Tobacco Use   Smoking Status Never Smoker   Smokeless Tobacco Never Used     Social history reviewed.  No changes from prior note on 9/25/2018.  No smoking.    Allergies   Allergen Reactions   • Lisinopril Cough     Outpatient Encounter Medications as of 9/18/2019   Medication Sig Dispense Refill   • triamcinolone acetonide (KENALOG) 0.1 % Cream APPLY SPARINGLY TOPICAL 2 TIMES A DAY AS NEEDED  0   • losartan (COZAAR) 25 MG Tab Take 0.5 Tabs by mouth every day. 45 Tab 3   • triamterene/hctz (MAXZIDE-25/DYAZIDE) 37.5-25 MG Cap Take 1 Cap by mouth every morning. 90 Cap 3   • DENTA 5000 PLUS 1.1 % Cream USE AS REGULAR TOOTHPASTE. SPIT OUT BUT DO NOT RINSE. NO FOOD OR DRINK FOR 30 MIN. AFTERWARDS.  3   • levothyroxine (SYNTHROID) 88 MCG Tab TAKE 1 TABLET BY MOUTH DAILY  3   • ascorbic acid (VITAMIN C) 100 MG tablet Take 1,000 mg by mouth every day.     • cholecalciferol (VITAMIN D3) 5000 UNIT Cap Take 5,000 Units by mouth every day.     • B Complex-C (SUPER B COMPLEX PO) Take 1 Tab by mouth every day.     • Glucosamine HCl (GLUCOSAMINE PO) Take 1 Tab by mouth every day.     • multivitamin (THERAGRAN) Tab Take 1 Tab by mouth every day.     • [DISCONTINUED] SHINGRIX 50 MCG/0.5ML Recon Susp TO BE ADMINISTERED BY  "PHARMACIST FOR IMMUNIZATION  0   • [DISCONTINUED] losartan (COZAAR) 25 MG Tab TAKE 1 TABLET BY MOUTH  EVERY DAY (Patient not taking: Reported on 9/18/2019) 90 Tab 3   • [DISCONTINUED] losartan (COZAAR) 25 MG Tab Take 0.5 Tabs by mouth every day. 90 Tab 3   • [DISCONTINUED] triamterene/hctz (MAXZIDE-25/DYAZIDE) 37.5-25 MG CAPS Take 1 Cap by mouth every morning. 30 Cap 0     No facility-administered encounter medications on file as of 9/18/2019.      Review of Systems   Constitutional: Negative for malaise/fatigue.   Respiratory: Negative for shortness of breath.    Cardiovascular: Negative for chest pain, palpitations, orthopnea, leg swelling and PND.   Gastrointestinal: Negative for abdominal pain.   Musculoskeletal: Negative for falls.   Neurological: Negative for dizziness and loss of consciousness.   Endo/Heme/Allergies: Does not bruise/bleed easily.   Psychiatric/Behavioral: Negative for depression.   All other systems reviewed and are negative.       Objective:   /60 (BP Location: Left arm, Patient Position: Sitting, BP Cuff Size: Adult)   Pulse 66   Ht 1.575 m (5' 2\")   Wt 53.1 kg (117 lb)   SpO2 94%   BMI 21.40 kg/m²      Physical Exam   Constitutional: She is oriented to person, place, and time. She appears well-developed and well-nourished. No distress.   HENT:   Head: Normocephalic and atraumatic.   Eyes: Conjunctivae are normal.   Neck: Normal range of motion. Neck supple. No JVD present.   Cardiovascular: Normal rate, regular rhythm and normal heart sounds. Exam reveals no gallop and no friction rub.   No murmur heard.  Pulmonary/Chest: Effort normal and breath sounds normal. No respiratory distress. She has no wheezes. She has no rales.   Abdominal: Soft. There is no tenderness.   Musculoskeletal: She exhibits no edema.   Neurological: She is alert and oriented to person, place, and time.   Skin: Skin is warm and dry. She is not diaphoretic.   Psychiatric: She has a normal mood and affect.   "   Nursing note and vitals reviewed.    Holter monitor from August 2017 showed normal sinus rhythm.  No significant arrhythmias noted.    Labs performed in September 2019 were reviewed and showed normal creatinine and potassium.  .    Assessment:     1. Essential hypertension  losartan (COZAAR) 25 MG Tab   2. Encounter for long-term (current) use of high-risk medication         Medical Decision Making:  Today's Assessment / Status / Plan:     Her blood pressures are well controlled.  Continue triamterene, hydrochlorothiazide and losartan at current dose.  Her renal functions normal.    Total 16 minutes face-to-face time spent with patient, with greater than 50% of the total time discussing patient's issues and symptoms as listed above in assessment and plan, as well as managing coordination of care for future evaluation and treatment. Most of the time was spent discussing management of her hypertension along with reviewing each of her medications with the patient and her .     Return to clinic in 1 year or earlier if needed.    Thank you for allowing me to participate in the care of this patient. Please do not hesitate to contact me with any questions.    Elizabeth Watts MD  Cardiologist  [facility]      PLEASE NOTE: This dictation was created using voice recognition software.         Adalberto Paris M.D.  [address] [facility] [address]  VIA Facsimile: [phone]

## 2020-07-03 ENCOUNTER — OFFICE VISIT (OUTPATIENT)
Dept: URGENT CARE | Facility: CLINIC | Age: 75
End: 2020-07-03
Payer: COMMERCIAL

## 2020-07-03 VITALS
TEMPERATURE: 97.5 F | DIASTOLIC BLOOD PRESSURE: 78 MMHG | WEIGHT: 124 LBS | HEIGHT: 63 IN | BODY MASS INDEX: 21.97 KG/M2 | SYSTOLIC BLOOD PRESSURE: 138 MMHG | OXYGEN SATURATION: 96 % | RESPIRATION RATE: 14 BRPM | HEART RATE: 65 BPM
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
## 2020-07-03 DIAGNOSIS — L08.9 SKIN INFECTION: ICD-10-CM

## 2020-07-03 PROCEDURE — 99214 OFFICE O/P EST MOD 30 MIN: CPT | Performed by: PHYSICIAN ASSISTANT

## 2020-07-03 RX ORDER — TRIAMCINOLONE ACETONIDE 1 MG/G
CREAM TOPICAL
Qty: 1 TUBE | Refills: 0 | Status: SHIPPED | OUTPATIENT
Start: 2020-07-03 | End: 2021-10-28

## 2020-07-03 RX ORDER — SULFAMETHOXAZOLE AND TRIMETHOPRIM 800; 160 MG/1; MG/1
1 TABLET ORAL 2 TIMES DAILY
Qty: 10 TAB | Refills: 0 | Status: SHIPPED | OUTPATIENT
Start: 2020-07-03 | End: 2020-07-08

## 2020-07-03 NOTE — PROGRESS NOTES
"Subjective:      Alexsandra Woods is a 75 y.o. female who presents with Blister (hands and face. poss allergic to mosquitos )            HPI  75-year-old female presents to urgent care with new problem of multiple insect bites over her hands and face onset a few days ago.  Patient reports swelling, pain, redness, and warmth has been increasing since onset.  She denies fevers or vomiting.  Patient reports she is having blister like lesions over bites. Denies other associated aggravating or alleviating factors.       Review of Systems   Constitutional: Negative for chills, fever and malaise/fatigue.   Gastrointestinal: Negative for abdominal pain, nausea and vomiting.   Musculoskeletal: Negative for myalgias and neck pain.   Skin:        Insect bites   Neurological: Negative for dizziness, tingling, sensory change and headaches.   Endo/Heme/Allergies: Positive for environmental allergies.   All other systems reviewed and are negative.      Past Medical History:   Diagnosis Date   • Allergic rhinitis due to pollen    • Anemia     from chemo   • Breast cancer (HCC)     right, chemotherapy   • Cancer (HCC) 2003    breast RIGHT SIDE   • Cataract     Fausto IOL   • High cholesterol    • HTN (hypertension)    • Hypothyroid    • Pneumonia 1969   • Rotator cuff disorder    • Shingles    • Tinnitus      Medications and allergies reviewed in epic.  Social History     Tobacco Use   • Smoking status: Never Smoker   • Smokeless tobacco: Never Used   Substance Use Topics   • Alcohol use: No     Alcohol/week: 1.0 oz     Types: 2 Glasses of wine per week     Comment: Rare      Objective:     /78 (BP Location: Left arm, Patient Position: Sitting, BP Cuff Size: Adult long)   Pulse 65   Temp 36.4 °C (97.5 °F) (Temporal)   Resp 14   Ht 1.6 m (5' 3\")   Wt 56.2 kg (124 lb)   SpO2 96%   BMI 21.97 kg/m²      Physical Exam  Vitals signs reviewed.   Constitutional:       General: She is not in acute distress.     Appearance: " Normal appearance. She is well-developed. She is not ill-appearing.   HENT:      Head: Normocephalic and atraumatic.      Nose: Nose normal.      Mouth/Throat:      Mouth: Mucous membranes are moist.      Pharynx: Oropharynx is clear.   Eyes:      Conjunctiva/sclera: Conjunctivae normal.   Neck:      Musculoskeletal: Normal range of motion and neck supple.   Cardiovascular:      Rate and Rhythm: Normal rate and regular rhythm.   Pulmonary:      Effort: Pulmonary effort is normal. No respiratory distress.   Abdominal:      Palpations: Abdomen is soft.   Lymphadenopathy:      Cervical: No cervical adenopathy.   Skin:            Comments: 3 areas of slight induration with puncture.  Erythematous ulcerated area to the center with slight serosanguenous drainage.    Neurological:      General: No focal deficit present.      Mental Status: She is alert and oriented to person, place, and time.   Psychiatric:         Mood and Affect: Mood normal.         Behavior: Behavior normal.         Thought Content: Thought content normal.         Judgment: Judgment normal.                 Assessment/Plan:     1. Skin infection  triamcinolone acetonide (KENALOG) 0.1 % Cream   2. Insect bite, multiple  sulfamethoxazole-trimethoprim (BACTRIM DS) 800-160 MG tablet     PT should follow up with PCP in 1-2 days for re-evaluation if symptoms have not improved.  Discussed red flags and reasons to return to UC or ED.  Pt and/or family verbalized understanding of diagnosis and follow up instructions and was offered informational handout on diagnosis.  PT discharged.

## 2020-08-21 ENCOUNTER — OFFICE VISIT (OUTPATIENT)
Dept: CARDIOLOGY | Facility: MEDICAL CENTER | Age: 75
End: 2020-08-21
Payer: COMMERCIAL

## 2020-08-21 VITALS
DIASTOLIC BLOOD PRESSURE: 72 MMHG | SYSTOLIC BLOOD PRESSURE: 120 MMHG | HEART RATE: 58 BPM | BODY MASS INDEX: 21.97 KG/M2 | HEIGHT: 63 IN | OXYGEN SATURATION: 97 % | WEIGHT: 124 LBS

## 2020-08-21 DIAGNOSIS — Z79.899 ENCOUNTER FOR LONG-TERM (CURRENT) USE OF HIGH-RISK MEDICATION: ICD-10-CM

## 2020-08-21 DIAGNOSIS — Z13.220 SCREENING FOR CHOLESTEROL LEVEL: ICD-10-CM

## 2020-08-21 DIAGNOSIS — I10 ESSENTIAL HYPERTENSION: ICD-10-CM

## 2020-08-21 PROCEDURE — 99213 OFFICE O/P EST LOW 20 MIN: CPT | Performed by: INTERNAL MEDICINE

## 2020-08-21 RX ORDER — TRIAMTERENE AND HYDROCHLOROTHIAZIDE 37.5; 25 MG/1; MG/1
1 CAPSULE ORAL EVERY MORNING
Qty: 90 CAP | Refills: 3 | Status: SHIPPED | OUTPATIENT
Start: 2020-08-21 | End: 2021-10-28 | Stop reason: SDUPTHER

## 2020-08-21 RX ORDER — LOSARTAN POTASSIUM 25 MG/1
12.5 TABLET ORAL DAILY
Qty: 45 TAB | Refills: 3 | Status: SHIPPED | OUTPATIENT
Start: 2020-08-21 | End: 2021-10-28 | Stop reason: SDUPTHER

## 2020-08-21 ASSESSMENT — ENCOUNTER SYMPTOMS
DIZZINESS: 0
ORTHOPNEA: 0
SHORTNESS OF BREATH: 0
PND: 0
LOSS OF CONSCIOUSNESS: 0
ABDOMINAL PAIN: 0
PALPITATIONS: 0
FALLS: 0
DEPRESSION: 0

## 2020-08-21 NOTE — PROGRESS NOTES
Chief Complaint   Patient presents with   • HTN (Controlled)       Subjective:   Alexsandra Woods is a 75 y.o. female who presents today to follow-up on hypertension.    Pertinent history:  Hypertension  History of breast cancer in 2003 status post mastectomy and chemotherapy.  No radiation.  Hypothyroidism     Patient has been doing really well from a cardiac standpoint.  Denies any anginal symptoms.  She is currently on furlough from the [employer] and has been taking care of her yard.  She has lost 30 pounds with activity.    Her blood pressure has been consistently well controlled.    Past Medical History:   Diagnosis Date   • Allergic rhinitis due to pollen    • Anemia     from chemo   • Breast cancer (HCC)     right, chemotherapy   • Cancer (HCC) 2003    breast RIGHT SIDE   • Cataract     Fausto IOL   • High cholesterol    • HTN (hypertension)    • Hypothyroid    • Pneumonia 1969   • Rotator cuff disorder    • Shingles    • Tinnitus      Past Surgical History:   Procedure Laterality Date   • CARPAL TUNNEL RELEASE Left 10/3/2017    Procedure: CARPAL TUNNEL RELEASE;  Surgeon: Sarita Madrid M.D.;  Location: SURGERY [facility];  Service: Orthopedics   • CATARACT EXTRACTION WITH IOL Bilateral 7/2015   • CARPAL TUNNEL RELEASE  2015   • LAMINOTOMY  11/18/2009    LEFT Performed by SARITA BURKS at SURGERY [facility]   • ROTATOR CUFF REPAIR  2/5/2009    Performed by OSGOOD, PATRICK J at [facility]   • SHOULDER DECOMPRESSION  2/5/2009    Performed by OSGOOD, PATRICK J at [facility]   • ROTATOR CUFF REPAIR Right 2009   • KNEE ARTHROSCOPY Right 2005   • BREAST RECONSTRUCTION  6/2004   • MASTECTOMY Bilateral 2003     excision lymph nodes right axillary   • CATH, PORT-A-CATH  2003   • BREAST IMPLANT REMOVAL      expanders removed   • CYST EXCISION     • LYMPH NODE EXCISION Right     arm   • OTHER SURGICAL PROCEDURE      9/2003 port placed, 3/2004 port removed      Family History   Problem Relation Age of Onset   • Stroke Mother 78   • Cancer Mother         Cervical   • Lung Disease Mother    • Hypertension Father    • Cancer Maternal Grandfather         Lung     Social History     Socioeconomic History   • Marital status:      Spouse name: Not on file   • Number of children: Not on file   • Years of education: Not on file   • Highest education level: Not on file   Occupational History   • Occupation:      Employer: [employer]   Social Needs   • Financial resource strain: Not on file   • Food insecurity     Worry: Not on file     Inability: Not on file   • Transportation needs     Medical: Not on file     Non-medical: Not on file   Tobacco Use   • Smoking status: Never Smoker   • Smokeless tobacco: Never Used   Substance and Sexual Activity   • Alcohol use: No     Alcohol/week: 1.0 oz     Types: 2 Glasses of wine per week     Comment: Rare   • Drug use: No   • Sexual activity: Not Currently     Partners: Male   Lifestyle   • Physical activity     Days per week: Not on file     Minutes per session: Not on file   • Stress: Not on file   Relationships   • Social connections     Talks on phone: Not on file     Gets together: Not on file     Attends Jehovah's witness service: Not on file     Active member of club or organization: Not on file     Attends meetings of clubs or organizations: Not on file     Relationship status: Not on file   • Intimate partner violence     Fear of current or ex partner: Not on file     Emotionally abused: Not on file     Physically abused: Not on file     Forced sexual activity: Not on file   Other Topics Concern   • Not on file   Social History Narrative     with 2 children.     Allergies   Allergen Reactions   • Lisinopril Cough     Outpatient Encounter Medications as of 8/21/2020   Medication Sig Dispense Refill   • triamterene/hctz (MAXZIDE-25/DYAZIDE) 37.5-25 MG Cap Take 1 Cap by mouth every morning. 90 Cap 3   • losartan  "(COZAAR) 25 MG Tab Take 0.5 Tabs by mouth every day. 45 Tab 3   • triamcinolone acetonide (KENALOG) 0.1 % Cream APPLY SPARINGLY TOPICAL 2 TIMES A DAY AS NEEDED 1 Tube 0   • DENTA 5000 PLUS 1.1 % Cream USE AS REGULAR TOOTHPASTE. SPIT OUT BUT DO NOT RINSE. NO FOOD OR DRINK FOR 30 MIN. AFTERWARDS.  3   • levothyroxine (SYNTHROID) 88 MCG Tab TAKE 1 TABLET BY MOUTH DAILY  3   • ascorbic acid (VITAMIN C) 100 MG tablet Take 1,000 mg by mouth every day.     • cholecalciferol (VITAMIN D3) 5000 UNIT Cap Take 5,000 Units by mouth every day.     • B Complex-C (SUPER B COMPLEX PO) Take 1 Tab by mouth every day.     • Glucosamine HCl (GLUCOSAMINE PO) Take 1 Tab by mouth every day.     • multivitamin (THERAGRAN) Tab Take 1 Tab by mouth every day.     • [DISCONTINUED] losartan (COZAAR) 25 MG Tab Take 0.5 Tabs by mouth every day. 45 Tab 3   • [DISCONTINUED] triamterene/hctz (MAXZIDE-25/DYAZIDE) 37.5-25 MG Cap Take 1 Cap by mouth every morning. 90 Cap 3     No facility-administered encounter medications on file as of 8/21/2020.      Review of Systems   Constitutional: Negative for malaise/fatigue.   Respiratory: Negative for shortness of breath.    Cardiovascular: Negative for chest pain, palpitations, orthopnea, leg swelling and PND.   Gastrointestinal: Negative for abdominal pain.   Musculoskeletal: Negative for falls.   Neurological: Negative for dizziness and loss of consciousness.   Psychiatric/Behavioral: Negative for depression.   All other systems reviewed and are negative.       Objective:   /72 (BP Location: Left arm, Patient Position: Sitting, BP Cuff Size: Adult)   Pulse (!) 58   Ht 1.6 m (5' 3\")   Wt 56.2 kg (124 lb)   SpO2 97%   BMI 21.97 kg/m²     Physical Exam   Constitutional: She is oriented to person, place, and time. She appears well-developed and well-nourished. No distress.   HENT:   Head: Normocephalic and atraumatic.   Eyes: Conjunctivae are normal. No scleral icterus.   Neck: Normal range of " motion. Neck supple.   Cardiovascular: Normal rate, regular rhythm and normal heart sounds. Exam reveals no gallop and no friction rub.   No murmur heard.  Pulmonary/Chest: Effort normal and breath sounds normal. No respiratory distress. She has no wheezes. She has no rales.   Musculoskeletal:         General: No edema.   Neurological: She is alert and oriented to person, place, and time.   Skin: Skin is warm and dry. She is not diaphoretic.   Psychiatric: She has a normal mood and affect. Her behavior is normal. Judgment and thought content normal.   Nursing note and vitals reviewed.    Assessment:     1. Essential hypertension  Basic Metabolic Panel    losartan (COZAAR) 25 MG Tab   2. Screening for cholesterol level  Lipid Profile   3. Encounter for long-term (current) use of high-risk medication        Medical Decision Making:  Today's Assessment / Status / Plan:     Patient doing well from a cardiac standpoint.  Blood pressure is at goal.  Continue losartan and triamterene/hydrochlorothiazide combination pill at current dose.  Chem-7 ordered for evaluation of renal function.    Total 15 minutes face-to-face time spent with patient, with greater than 50% of the total time discussing patient's issues and symptoms as listed above in assessment and plan, as well as managing coordination of care for future evaluation and treatment. Most of the time was spent discussing ongoing management of her hypertension.  We also discussed exercise and activity.    Return to clinic if needed.    Thank you for allowing me to participate in the care of this patient. Please do not hesitate to contact me with any questions.    Elizabeth Watts MD, [facility]  Cardiologist  [facility]    PLEASE NOTE: This dictation was created using voice recognition software.

## 2020-08-21 NOTE — LETTER
[facility]   [address] [place] [address]  [place] Ramos [address]  Phone: [phone]  Fax: [phone]              Alexsandra Woods  1945    Encounter Date: 8/21/2020    Elizabeth Watts M.D.          PROGRESS NOTE:  Chief Complaint   Patient presents with   • HTN (Controlled)       Subjective:   Alexsandra Woods is a 75 y.o. female who presents today to follow-up on hypertension.    Pertinent history:  Hypertension  History of breast cancer in 2003 status post mastectomy and chemotherapy.  No radiation.  Hypothyroidism     Patient has been doing really well from a cardiac standpoint.  Denies any anginal symptoms.  She is currently on [employer] from the [employer] and has been taking care of her yard.  She has lost 30 pounds with activity.    Her blood pressure has been consistently well controlled.    Past Medical History:   Diagnosis Date   • Allergic rhinitis due to pollen    • Anemia     from chemo   • Breast cancer (HCC)     right, chemotherapy   • Cancer (HCC) 2003    breast RIGHT SIDE   • Cataract     Fausto IOL   • High cholesterol    • HTN (hypertension)    • Hypothyroid    • Pneumonia 1969   • Rotator cuff disorder    • Shingles    • Tinnitus      Past Surgical History:   Procedure Laterality Date   • CARPAL TUNNEL RELEASE Left 10/3/2017    Procedure: CARPAL TUNNEL RELEASE;  Surgeon: Sarita Madrid M.D.;  Location: [facility];  Service: Orthopedics   • CATARACT EXTRACTION WITH IOL Bilateral 7/2015   • CARPAL TUNNEL RELEASE  2015   • LAMINOTOMY  11/18/2009    LEFT Performed by SARITA BURKS at SURGERY [facility]   • ROTATOR CUFF REPAIR  2/5/2009    Performed by OSGOOD, PATRICK J at [facility]   • SHOULDER DECOMPRESSION  2/5/2009    Performed by OSGOOD, PATRICK J at [facility]   • ROTATOR CUFF REPAIR Right 2009   • KNEE ARTHROSCOPY Right 2005   • BREAST RECONSTRUCTION  6/2004   • MASTECTOMY  Bilateral 2003     excision lymph nodes right axillary   • CATH, PORT-A-CATH  2003   • BREAST IMPLANT REMOVAL      expanders removed   • CYST EXCISION     • LYMPH NODE EXCISION Right     arm   • OTHER SURGICAL PROCEDURE      9/2003 port placed, 3/2004 port removed     Family History   Problem Relation Age of Onset   • Stroke Mother 78   • Cancer Mother         Cervical   • Lung Disease Mother    • Hypertension Father    • Cancer Maternal Grandfather         Lung     Social History     Socioeconomic History   • Marital status:      Spouse name: Not on file   • Number of children: Not on file   • Years of education: Not on file   • Highest education level: Not on file   Occupational History   • Occupation:      Employer: [employer]   Social Needs   • Financial resource strain: Not on file   • Food insecurity     Worry: Not on file     Inability: Not on file   • Transportation needs     Medical: Not on file     Non-medical: Not on file   Tobacco Use   • Smoking status: Never Smoker   • Smokeless tobacco: Never Used   Substance and Sexual Activity   • Alcohol use: No     Alcohol/week: 1.0 oz     Types: 2 Glasses of wine per week     Comment: Rare   • Drug use: No   • Sexual activity: Not Currently     Partners: Male   Lifestyle   • Physical activity     Days per week: Not on file     Minutes per session: Not on file   • Stress: Not on file   Relationships   • Social connections     Talks on phone: Not on file     Gets together: Not on file     Attends Tenriism service: Not on file     Active member of club or organization: Not on file     Attends meetings of clubs or organizations: Not on file     Relationship status: Not on file   • Intimate partner violence     Fear of current or ex partner: Not on file     Emotionally abused: Not on file     Physically abused: Not on file     Forced sexual activity: Not on file   Other Topics Concern   • Not on file   Social History Narrative     with 2  "children.     Allergies   Allergen Reactions   • Lisinopril Cough     Outpatient Encounter Medications as of 8/21/2020   Medication Sig Dispense Refill   • triamterene/hctz (MAXZIDE-25/DYAZIDE) 37.5-25 MG Cap Take 1 Cap by mouth every morning. 90 Cap 3   • losartan (COZAAR) 25 MG Tab Take 0.5 Tabs by mouth every day. 45 Tab 3   • triamcinolone acetonide (KENALOG) 0.1 % Cream APPLY SPARINGLY TOPICAL 2 TIMES A DAY AS NEEDED 1 Tube 0   • DENTA 5000 PLUS 1.1 % Cream USE AS REGULAR TOOTHPASTE. SPIT OUT BUT DO NOT RINSE. NO FOOD OR DRINK FOR 30 MIN. AFTERWARDS.  3   • levothyroxine (SYNTHROID) 88 MCG Tab TAKE 1 TABLET BY MOUTH DAILY  3   • ascorbic acid (VITAMIN C) 100 MG tablet Take 1,000 mg by mouth every day.     • cholecalciferol (VITAMIN D3) 5000 UNIT Cap Take 5,000 Units by mouth every day.     • B Complex-C (SUPER B COMPLEX PO) Take 1 Tab by mouth every day.     • Glucosamine HCl (GLUCOSAMINE PO) Take 1 Tab by mouth every day.     • multivitamin (THERAGRAN) Tab Take 1 Tab by mouth every day.     • [DISCONTINUED] losartan (COZAAR) 25 MG Tab Take 0.5 Tabs by mouth every day. 45 Tab 3   • [DISCONTINUED] triamterene/hctz (MAXZIDE-25/DYAZIDE) 37.5-25 MG Cap Take 1 Cap by mouth every morning. 90 Cap 3     No facility-administered encounter medications on file as of 8/21/2020.      Review of Systems   Constitutional: Negative for malaise/fatigue.   Respiratory: Negative for shortness of breath.    Cardiovascular: Negative for chest pain, palpitations, orthopnea, leg swelling and PND.   Gastrointestinal: Negative for abdominal pain.   Musculoskeletal: Negative for falls.   Neurological: Negative for dizziness and loss of consciousness.   Psychiatric/Behavioral: Negative for depression.   All other systems reviewed and are negative.       Objective:   /72 (BP Location: Left arm, Patient Position: Sitting, BP Cuff Size: Adult)   Pulse (!) 58   Ht 1.6 m (5' 3\")   Wt 56.2 kg (124 lb)   SpO2 97%   BMI 21.97 kg/m²     "     Physical Exam   Constitutional: She is oriented to person, place, and time. She appears well-developed and well-nourished. No distress.   HENT:   Head: Normocephalic and atraumatic.   Eyes: Conjunctivae are normal. No scleral icterus.   Neck: Normal range of motion. Neck supple.   Cardiovascular: Normal rate, regular rhythm and normal heart sounds. Exam reveals no gallop and no friction rub.   No murmur heard.  Pulmonary/Chest: Effort normal and breath sounds normal. No respiratory distress. She has no wheezes. She has no rales.   Musculoskeletal:         General: No edema.   Neurological: She is alert and oriented to person, place, and time.   Skin: Skin is warm and dry. She is not diaphoretic.   Psychiatric: She has a normal mood and affect. Her behavior is normal. Judgment and thought content normal.   Nursing note and vitals reviewed.    Assessment:     1. Essential hypertension  Basic Metabolic Panel    losartan (COZAAR) 25 MG Tab   2. Screening for cholesterol level  Lipid Profile   3. Encounter for long-term (current) use of high-risk medication        Medical Decision Making:  Today's Assessment / Status / Plan:     Patient doing well from a cardiac standpoint.  Blood pressure is at goal.  Continue losartan and triamterene/hydrochlorothiazide combination pill at current dose.  Chem-7 ordered for evaluation of renal function.    Total 15 minutes face-to-face time spent with patient, with greater than 50% of the total time discussing patient's issues and symptoms as listed above in assessment and plan, as well as managing coordination of care for future evaluation and treatment. Most of the time was spent discussing ongoing management of her hypertension.  We also discussed exercise and activity.    Return to clinic if needed.    Thank you for allowing me to participate in the care of this patient. Please do not hesitate to contact me with any questions.    Elizabeth Watts MD, [facility]  Cardiologist  [facility]    PLEASE NOTE: This dictation was created using voice recognition software.         Adalberto Paris M.D.  [facility] [address] [facility] [address]  Via Fax: [phone]

## 2020-08-26 ENCOUNTER — OFFICE VISIT (OUTPATIENT)
Dept: URGENT CARE | Facility: CLINIC | Age: 75
End: 2020-08-26
Payer: COMMERCIAL

## 2020-08-26 VITALS
HEIGHT: 62 IN | HEART RATE: 67 BPM | DIASTOLIC BLOOD PRESSURE: 80 MMHG | WEIGHT: 122 LBS | SYSTOLIC BLOOD PRESSURE: 150 MMHG | TEMPERATURE: 97.7 F | BODY MASS INDEX: 22.45 KG/M2 | OXYGEN SATURATION: 96 %
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
## 2020-08-26 PROCEDURE — 99214 OFFICE O/P EST MOD 30 MIN: CPT | Performed by: PHYSICIAN ASSISTANT

## 2020-08-26 RX ORDER — DOXYCYCLINE HYCLATE 100 MG
100 TABLET ORAL 2 TIMES DAILY
Qty: 10 TAB | Refills: 0 | Status: SHIPPED | OUTPATIENT
Start: 2020-08-26 | End: 2020-08-31

## 2020-08-26 RX ORDER — TRIAMCINOLONE ACETONIDE 1 MG/G
CREAM TOPICAL
Qty: 15 G | Refills: 0 | Status: SHIPPED | OUTPATIENT
Start: 2020-08-26 | End: 2021-10-28

## 2020-08-26 ASSESSMENT — ENCOUNTER SYMPTOMS
NAUSEA: 0
DIZZINESS: 0
FEVER: 0
SENSORY CHANGE: 0
ABDOMINAL PAIN: 0
HEADACHES: 0
TINGLING: 0
MYALGIAS: 0
CHILLS: 0
NECK PAIN: 0
VOMITING: 0
ROS SKIN COMMENTS: INSECT BITES

## 2020-08-26 NOTE — PROGRESS NOTES
"Subjective:      Alexsandra Woods is a 75 y.o. female who presents with Allergic Reaction (Rt hand bug bite, blistering )            HPI  75-year-old female presents to urgent care with new problem of multiple insect bites over right hand onset a few days ago.  Patient reports swelling, pain, redness, and warmth has been increasing since onset.  Reports development of blister this morning. She denies fevers or vomiting. Hx of similar about 6 weeks ago. Treated for skin infection secondary to inset bites. Denies other associated aggravating or alleviating factors.    ROS    Past Medical History:   Diagnosis Date   • Allergic rhinitis due to pollen    • Anemia     from chemo   • Breast cancer (HCC)     right, chemotherapy   • Cancer (HCC) 2003    breast RIGHT SIDE   • Cataract     Fausto IOL   • High cholesterol    • HTN (hypertension)    • Hypothyroid    • Pneumonia 1969   • Rotator cuff disorder    • Shingles    • Tinnitus      Medications and allergies reviewed in [facility].  Social History     Tobacco Use   • Smoking status: Never Smoker   • Smokeless tobacco: Never Used   Substance Use Topics   • Alcohol use: No     Alcohol/week: 1.0 oz     Types: 2 Glasses of wine per week     Comment: Rare      Objective:     /80   Pulse 67   Temp 36.5 °C (97.7 °F) (Temporal)   Ht 1.575 m (5' 2\")   Wt 55.3 kg (122 lb)   SpO2 96%   BMI 22.31 kg/m²      Physical Exam  Vitals signs reviewed.   Constitutional:       Appearance: Normal appearance. She is well-developed. She is not ill-appearing.   HENT:      Head: Normocephalic and atraumatic.      Nose: Nose normal.      Mouth/Throat:      Mouth: Mucous membranes are moist.      Pharynx: Oropharynx is clear.   Eyes:      Conjunctiva/sclera: Conjunctivae normal.   Neck:      Musculoskeletal: Normal range of motion and neck supple.   Cardiovascular:      Rate and Rhythm: Normal rate and regular rhythm.      Heart sounds: Normal heart sounds.   Pulmonary:      Effort: " Pulmonary effort is normal. No respiratory distress.      Breath sounds: Normal breath sounds.   Musculoskeletal: Normal range of motion.         General: No deformity or signs of injury.   Skin:         Neurological:      General: No focal deficit present.      Mental Status: She is alert and oriented to person, place, and time.      Sensory: No sensory deficit.      Motor: No weakness.   Psychiatric:         Mood and Affect: Mood normal.         Behavior: Behavior normal.         Thought Content: Thought content normal.         Judgment: Judgment normal.                 Assessment/Plan:     1. Bug bite with infection, initial encounter  doxycycline (VIBRAMYCIN) 100 MG Tab    triamcinolone acetonide (KENALOG) 0.1 % Cream     Over-the-counter Tylenol for pain.  Patient may apply ice to affected area.  Apply triamcinolone cream twice daily as needed.  Monitor increasing redness, fever, vomiting, or pain.  PT should follow up with PCP in 1-2 days for re-evaluation if symptoms have not improved.  Discussed red flags and reasons to return to UC or ED.  Pt and/or family verbalized understanding of diagnosis and follow up instructions and was offered informational handout on diagnosis.  PT discharged.

## 2020-09-02 ENCOUNTER — HOSPITAL ENCOUNTER (OUTPATIENT)
Dept: LAB | Facility: MEDICAL CENTER | Age: 75
End: 2020-09-02
Attending: INTERNAL MEDICINE
Payer: MEDICARE

## 2020-09-02 DIAGNOSIS — I10 ESSENTIAL HYPERTENSION: ICD-10-CM

## 2020-09-02 DIAGNOSIS — Z13.220 SCREENING FOR CHOLESTEROL LEVEL: ICD-10-CM

## 2020-09-02 LAB
ANION GAP SERPL CALC-SCNC: 12 MMOL/L (ref 7–16)
BUN SERPL-MCNC: 24 MG/DL (ref 8–22)
CALCIUM SERPL-MCNC: 10 MG/DL (ref 8.5–10.5)
CHLORIDE SERPL-SCNC: 100 MMOL/L (ref 96–112)
CHOLEST SERPL-MCNC: 200 MG/DL (ref 100–199)
CO2 SERPL-SCNC: 26 MMOL/L (ref 20–33)
CREAT SERPL-MCNC: 0.91 MG/DL (ref 0.5–1.4)
FASTING STATUS PATIENT QL REPORTED: NORMAL
GLUCOSE SERPL-MCNC: 87 MG/DL (ref 65–99)
HDLC SERPL-MCNC: 80 MG/DL
LDLC SERPL CALC-MCNC: 110 MG/DL
POTASSIUM SERPL-SCNC: 3.8 MMOL/L (ref 3.6–5.5)
SODIUM SERPL-SCNC: 138 MMOL/L (ref 135–145)
TRIGL SERPL-MCNC: 52 MG/DL (ref 0–149)

## 2020-09-02 PROCEDURE — 80061 LIPID PANEL: CPT

## 2020-09-02 PROCEDURE — 80048 BASIC METABOLIC PNL TOTAL CA: CPT

## 2020-09-02 PROCEDURE — 36415 COLL VENOUS BLD VENIPUNCTURE: CPT

## 2020-12-26 ENCOUNTER — HOSPITAL ENCOUNTER (OUTPATIENT)
Dept: LAB | Facility: MEDICAL CENTER | Age: 75
End: 2020-12-26
Attending: ORTHOPAEDIC SURGERY
Payer: MEDICARE

## 2020-12-26 LAB — COVID ORDER STATUS COVID19: NORMAL

## 2020-12-26 PROCEDURE — U0003 INFECTIOUS AGENT DETECTION BY NUCLEIC ACID (DNA OR RNA); SEVERE ACUTE RESPIRATORY SYNDROME CORONAVIRUS 2 (SARS-COV-2) (CORONAVIRUS DISEASE [COVID-19]), AMPLIFIED PROBE TECHNIQUE, MAKING USE OF HIGH THROUGHPUT TECHNOLOGIES AS DESCRIBED BY CMS-2020-01-R: HCPCS

## 2020-12-26 PROCEDURE — C9803 HOPD COVID-19 SPEC COLLECT: HCPCS

## 2020-12-27 LAB
SARS-COV-2 RNA RESP QL NAA+PROBE: NOTDETECTED
SPECIMEN SOURCE: NORMAL

## 2020-12-30 ENCOUNTER — HOSPITAL ENCOUNTER (OUTPATIENT)
Dept: HOSPITAL 8 - STAR | Age: 75
Discharge: HOME | End: 2020-12-30
Attending: ANESTHESIOLOGY
Payer: MEDICARE

## 2020-12-30 DIAGNOSIS — Z01.818: Primary | ICD-10-CM

## 2020-12-30 DIAGNOSIS — Z01.89: ICD-10-CM

## 2020-12-30 DIAGNOSIS — R79.1: ICD-10-CM

## 2020-12-30 DIAGNOSIS — Z01.812: ICD-10-CM

## 2020-12-30 PROCEDURE — 93005 ELECTROCARDIOGRAM TRACING: CPT

## 2021-01-14 DIAGNOSIS — Z23 NEED FOR VACCINATION: ICD-10-CM

## 2021-01-26 ENCOUNTER — IMMUNIZATION (OUTPATIENT)
Dept: FAMILY PLANNING/WOMEN'S HEALTH CLINIC | Facility: IMMUNIZATION CENTER | Age: 76
End: 2021-01-26
Payer: MEDICARE

## 2021-01-26 DIAGNOSIS — Z23 NEED FOR VACCINATION: ICD-10-CM

## 2021-01-26 DIAGNOSIS — Z23 ENCOUNTER FOR VACCINATION: Primary | ICD-10-CM

## 2021-01-26 PROCEDURE — 0001A PFIZER SARS-COV-2 VACCINE: CPT | Performed by: NURSE PRACTITIONER

## 2021-01-26 PROCEDURE — 91300 PFIZER SARS-COV-2 VACCINE: CPT | Performed by: NURSE PRACTITIONER

## 2021-02-19 ENCOUNTER — IMMUNIZATION (OUTPATIENT)
Dept: FAMILY PLANNING/WOMEN'S HEALTH CLINIC | Facility: IMMUNIZATION CENTER | Age: 76
End: 2021-02-19
Attending: INTERNAL MEDICINE
Payer: MEDICARE

## 2021-02-19 DIAGNOSIS — Z23 ENCOUNTER FOR VACCINATION: Primary | ICD-10-CM

## 2021-02-19 PROCEDURE — 0002A PFIZER SARS-COV-2 VACCINE: CPT

## 2021-02-19 PROCEDURE — 91300 PFIZER SARS-COV-2 VACCINE: CPT

## 2021-10-28 ENCOUNTER — OFFICE VISIT (OUTPATIENT)
Dept: MEDICAL GROUP | Facility: OTHER | Age: 76
End: 2021-10-28
Payer: MEDICARE

## 2021-10-28 VITALS
OXYGEN SATURATION: 94 % | RESPIRATION RATE: 14 BRPM | TEMPERATURE: 97.1 F | HEIGHT: 62 IN | WEIGHT: 134 LBS | DIASTOLIC BLOOD PRESSURE: 70 MMHG | HEART RATE: 63 BPM | BODY MASS INDEX: 24.66 KG/M2 | SYSTOLIC BLOOD PRESSURE: 128 MMHG

## 2021-10-28 DIAGNOSIS — Z00.00 MEDICARE ANNUAL WELLNESS VISIT, SUBSEQUENT: Primary | ICD-10-CM

## 2021-10-28 DIAGNOSIS — Z12.31 ENCOUNTER FOR SCREENING MAMMOGRAM FOR BREAST CANCER: ICD-10-CM

## 2021-10-28 DIAGNOSIS — E03.9 HYPOTHYROIDISM (ACQUIRED): ICD-10-CM

## 2021-10-28 DIAGNOSIS — I10 ESSENTIAL HYPERTENSION: ICD-10-CM

## 2021-10-28 DIAGNOSIS — Z00.00 ENCOUNTER FOR GENERAL ADULT MEDICAL EXAMINATION WITHOUT ABNORMAL FINDINGS: ICD-10-CM

## 2021-10-28 DIAGNOSIS — R73.09 ABNORMAL GLUCOSE: ICD-10-CM

## 2021-10-28 DIAGNOSIS — E78.2 MIXED HYPERLIPIDEMIA: ICD-10-CM

## 2021-10-28 DIAGNOSIS — Z11.59 NEED FOR HEPATITIS C SCREENING TEST: ICD-10-CM

## 2021-10-28 PROBLEM — E03.8 OTHER SPECIFIED HYPOTHYROIDISM: Status: ACTIVE | Noted: 2020-11-30

## 2021-10-28 PROBLEM — M65.30 ACQUIRED TRIGGER FINGER: Status: ACTIVE | Noted: 2020-11-30

## 2021-10-28 PROBLEM — E78.5 HYPERLIPIDEMIA: Status: ACTIVE | Noted: 2020-11-30

## 2021-10-28 PROCEDURE — 99214 OFFICE O/P EST MOD 30 MIN: CPT | Performed by: FAMILY MEDICINE

## 2021-10-28 RX ORDER — LEVOTHYROXINE SODIUM 88 UG/1
88 TABLET ORAL
Qty: 90 TABLET | Refills: 3 | Status: SHIPPED | OUTPATIENT
Start: 2021-10-28 | End: 2021-11-16 | Stop reason: SDUPTHER

## 2021-10-28 RX ORDER — LOSARTAN POTASSIUM 25 MG/1
12.5 TABLET ORAL DAILY
Qty: 45 TABLET | Refills: 3 | Status: SHIPPED | OUTPATIENT
Start: 2021-10-28 | End: 2021-11-16 | Stop reason: SDUPTHER

## 2021-10-28 RX ORDER — ATORVASTATIN CALCIUM 10 MG/1
TABLET, FILM COATED ORAL
COMMUNITY
End: 2021-10-28 | Stop reason: SDUPTHER

## 2021-10-28 RX ORDER — TRIAMTERENE AND HYDROCHLOROTHIAZIDE 37.5; 25 MG/1; MG/1
1 CAPSULE ORAL EVERY MORNING
Qty: 90 CAPSULE | Refills: 3 | Status: SHIPPED | OUTPATIENT
Start: 2021-10-28 | End: 2021-11-16 | Stop reason: SDUPTHER

## 2021-10-28 RX ORDER — ATORVASTATIN CALCIUM 10 MG/1
10 TABLET, FILM COATED ORAL DAILY
Qty: 90 TABLET | Refills: 3 | Status: SHIPPED | OUTPATIENT
Start: 2021-10-28 | End: 2021-11-16 | Stop reason: SDUPTHER

## 2021-10-28 ASSESSMENT — ENCOUNTER SYMPTOMS
FEVER: 0
COUGH: 0

## 2021-10-28 ASSESSMENT — PATIENT HEALTH QUESTIONNAIRE - PHQ9: CLINICAL INTERPRETATION OF PHQ2 SCORE: 0

## 2021-10-28 NOTE — PROGRESS NOTES
Subjective:   Alexsandra Woods is a 76 y.o. female here for the evaluation and management of Annual Wellness Visit (annual wellness )    Abnormal blood glucose-ongoing efforts at lifestyle changes    Hyperlipidemia-stable    Hypertension-stable    Hypothyroidism-stable  Problem   Abnormal Glucose   Hyperlipidemia   Acquired Trigger Finger   Hypertension   Other Specified Hypothyroidism   Encounter for General Adult Medical Examination Without Abnormal Findings       Review of Systems   Constitutional: Negative for fever.   Respiratory: Negative for cough.        Current Outpatient Medications   Medication Sig Dispense Refill   • atorvastatin (LIPITOR) 10 MG Tab atorvastatin 10 mg tablet     • triamterene/hctz (MAXZIDE-25/DYAZIDE) 37.5-25 MG Cap Take 1 Cap by mouth every morning. 90 Cap 3   • losartan (COZAAR) 25 MG Tab Take 0.5 Tabs by mouth every day. 45 Tab 3   • levothyroxine (SYNTHROID) 88 MCG Tab TAKE 1 TABLET BY MOUTH DAILY  3   • ascorbic acid (VITAMIN C) 100 MG tablet Take 1,000 mg by mouth every day.     • cholecalciferol (VITAMIN D3) 5000 UNIT Cap Take 5,000 Units by mouth every day.     • B Complex-C (SUPER B COMPLEX PO) Take 1 Tab by mouth every day.     • Glucosamine HCl (GLUCOSAMINE PO) Take 1 Tab by mouth every day.     • multivitamin (THERAGRAN) Tab Take 1 Tab by mouth every day.     • triamcinolone acetonide (KENALOG) 0.1 % Cream Apply to affected area twice daily for 1 to 2 weeks (Patient not taking: Reported on 10/28/2021) 15 g 0   • triamcinolone acetonide (KENALOG) 0.1 % Cream APPLY SPARINGLY TOPICAL 2 TIMES A DAY AS NEEDED (Patient not taking: Reported on 10/28/2021) 1 Tube 0   • DENTA 5000 PLUS 1.1 % Cream USE AS REGULAR TOOTHPASTE. SPIT OUT BUT DO NOT RINSE. NO FOOD OR DRINK FOR 30 MIN. AFTERWARDS. (Patient not taking: Reported on 10/28/2021)  3     No current facility-administered medications for this visit.     Allergies  Lisinopril and Other drug    Past Medical History:   Diagnosis  Date   • Allergic rhinitis due to pollen    • Anemia     from chemo   • Breast cancer (HCC)     right, chemotherapy   • Cancer (HCC) 2003    breast RIGHT SIDE   • Cataract     Fausto IOL   • High cholesterol    • HTN (hypertension)    • Hyperlipidemia 11/30/2020   • Hypothyroid    • Pneumonia 1969   • Rotator cuff disorder    • Shingles    • Tinnitus      Patient Active Problem List    Diagnosis Date Noted   • Abnormal glucose 11/30/2020   • Hyperlipidemia 11/30/2020   • Acquired trigger finger 11/30/2020   • Hypertension 11/30/2020   • Other specified hypothyroidism 11/30/2020   • Encounter for general adult medical examination without abnormal findings 11/30/2020   • Carpal tunnel syndrome on left 10/03/2017   • History of breast cancer 08/17/2012   • S/P colonoscopy 12/15/2011   • Vitamin d deficiency 12/15/2011   • Trigger thumb 12/15/2011   • Allergic rhinitis due to pollen    • Anemia    • Tinnitus    • Shingles 08/12/2011   • Hypothyroidism (acquired) 07/28/2011   • Breast cancer (HCC) 03/15/2010   • Essential hypertension 03/15/2010   • Routine health maintenance 03/15/2010       Past Surgical History  Past Surgical History:   Procedure Laterality Date   • CARPAL TUNNEL RELEASE Left 10/3/2017    Procedure: CARPAL TUNNEL RELEASE;  Surgeon: Sarita Madrid M.D.;  Location: SURGERY [facility];  Service: Orthopedics   • CATARACT EXTRACTION WITH IOL Bilateral 7/2015   • CARPAL TUNNEL RELEASE  2015   • LAMINOTOMY  11/18/2009    LEFT Performed by SARITA BURKS at SURGERY [facility]   • ROTATOR CUFF REPAIR  2/5/2009    Performed by OSGOOD, PATRICK J at [facility]   • SHOULDER DECOMPRESSION  2/5/2009    Performed by OSGOOD, PATRICK J at [facility]   • ROTATOR CUFF REPAIR Right 2009   • KNEE ARTHROSCOPY Right 2005   • BREAST RECONSTRUCTION  6/2004   • MASTECTOMY Bilateral 2003     excision lymph nodes right axillary   • CATH, PORT-A-CATH  2003   • BREAST IMPLANT REMOVAL       "expanders removed   • CYST EXCISION     • LYMPH NODE EXCISION Right     arm   • OTHER SURGICAL PROCEDURE      9/2003 port placed, 3/2004 port removed        Objective:     Vitals:    10/28/21 0957   BP: 128/70   BP Location: Left arm   Patient Position: Sitting   BP Cuff Size: Adult   Pulse: 63   Resp: 14   Temp: 36.2 °C (97.1 °F)   TempSrc: Temporal   SpO2: 94%   Weight: 60.8 kg (134 lb)   Height: 1.575 m (5' 2\")     Body mass index is 24.51 kg/m².     Physical Exam  Constitutional:       Appearance: Normal appearance.   HENT:      Head: Normocephalic.   Eyes:      Extraocular Movements: Extraocular movements intact.      Conjunctiva/sclera: Conjunctivae normal.   Cardiovascular:      Rate and Rhythm: Normal rate and regular rhythm.      Heart sounds: Normal heart sounds.   Pulmonary:      Effort: Pulmonary effort is normal.      Breath sounds: Normal breath sounds.   Skin:     General: Skin is warm and dry.   Neurological:      Mental Status: She is alert and oriented to person, place, and time. Mental status is at baseline.   Psychiatric:         Mood and Affect: Mood normal.         Behavior: Behavior normal.         Assessment and Plan:   Alexsandra Woods is a 76 y.o. female with a Annual Wellness Visit (annual wellness )     The following was discussed with the patient today.    Problem List Items Addressed This Visit     Essential hypertension    Relevant Medications    atorvastatin (LIPITOR) 10 MG Tab    Hypothyroidism (acquired)    Abnormal glucose    Encounter for general adult medical examination without abnormal findings        Reviewed and updated medications and refills provided, reviewed and discussed recent laboratory studies and treatment options, anticipatory guidance reviewed including home safety and advance care directives.  Patient reports she is up-to-date on immunizations, follow-up in 3 to 6 months  Followup: No follow-ups on file.    Adalberto Paris M.D.    Please note that this " dictation was created using voice recognition software. I have made every reasonable attempt to correct obvious errors, but I expect that there are errors of grammar and possibly content that I did not discover before finalizing the note.

## 2021-11-16 ENCOUNTER — TELEPHONE (OUTPATIENT)
Dept: MEDICAL GROUP | Facility: OTHER | Age: 76
End: 2021-11-16

## 2021-11-16 DIAGNOSIS — E03.9 HYPOTHYROIDISM (ACQUIRED): ICD-10-CM

## 2021-11-16 DIAGNOSIS — E78.2 MIXED HYPERLIPIDEMIA: ICD-10-CM

## 2021-11-16 DIAGNOSIS — I10 ESSENTIAL HYPERTENSION: ICD-10-CM

## 2021-11-16 RX ORDER — ATORVASTATIN CALCIUM 10 MG/1
10 TABLET, FILM COATED ORAL DAILY
Qty: 90 TABLET | Refills: 3 | Status: SHIPPED | OUTPATIENT
Start: 2021-11-16 | End: 2022-01-07 | Stop reason: SDUPTHER

## 2021-11-16 RX ORDER — TRIAMTERENE AND HYDROCHLOROTHIAZIDE 37.5; 25 MG/1; MG/1
1 CAPSULE ORAL EVERY MORNING
Qty: 90 CAPSULE | Refills: 3 | Status: SHIPPED | OUTPATIENT
Start: 2021-11-16 | End: 2022-01-07 | Stop reason: SDUPTHER

## 2021-11-16 RX ORDER — LOSARTAN POTASSIUM 25 MG/1
12.5 TABLET ORAL DAILY
Qty: 45 TABLET | Refills: 3 | Status: SHIPPED | OUTPATIENT
Start: 2021-11-16 | End: 2022-01-07 | Stop reason: SDUPTHER

## 2021-11-16 RX ORDER — LEVOTHYROXINE SODIUM 88 UG/1
88 TABLET ORAL
Qty: 90 TABLET | Refills: 3 | Status: SHIPPED | OUTPATIENT
Start: 2021-11-16 | End: 2022-01-07 | Stop reason: SDUPTHER

## 2021-11-16 NOTE — TELEPHONE ENCOUNTER
Good Day Dr. Paris;         We need you help with Alexsandra’s prescriptions.    She has 7 days of prescriptions and when I went to the [employer] [facility] for her prescriptions, the following prescriptions have been canceled by [facility] and when I called their customer service number, I was informed you need to send a new prescription.      Following are the four prescriptions:    ATORVASTATIN CALCIUM 10MG    LEVOTHYROXINESODIUM TAB 88MCG    LOSARTAN POTASSIUM TAB 25MG    TRIAMTERENE/HYDROCHLORO  THIAZIDE CAP 37.5-25MG         Please advise if you are able to refill the prescriptions.

## 2022-01-07 ENCOUNTER — OFFICE VISIT (OUTPATIENT)
Dept: MEDICAL GROUP | Facility: OTHER | Age: 77
End: 2022-01-07
Payer: MEDICARE

## 2022-01-07 VITALS
WEIGHT: 130 LBS | DIASTOLIC BLOOD PRESSURE: 71 MMHG | HEIGHT: 62 IN | BODY MASS INDEX: 23.92 KG/M2 | SYSTOLIC BLOOD PRESSURE: 138 MMHG | RESPIRATION RATE: 14 BRPM | OXYGEN SATURATION: 94 % | HEART RATE: 74 BPM | TEMPERATURE: 95.5 F

## 2022-01-07 DIAGNOSIS — E78.2 MIXED HYPERLIPIDEMIA: ICD-10-CM

## 2022-01-07 DIAGNOSIS — E03.8 OTHER SPECIFIED HYPOTHYROIDISM: ICD-10-CM

## 2022-01-07 DIAGNOSIS — I10 ESSENTIAL HYPERTENSION: ICD-10-CM

## 2022-01-07 DIAGNOSIS — Z00.00 ROUTINE HEALTH MAINTENANCE: ICD-10-CM

## 2022-01-07 DIAGNOSIS — F41.8 OTHER SPECIFIED ANXIETY DISORDERS: ICD-10-CM

## 2022-01-07 DIAGNOSIS — R73.01 IFG (IMPAIRED FASTING GLUCOSE): ICD-10-CM

## 2022-01-07 DIAGNOSIS — E03.9 HYPOTHYROIDISM (ACQUIRED): ICD-10-CM

## 2022-01-07 PROCEDURE — 99214 OFFICE O/P EST MOD 30 MIN: CPT | Performed by: FAMILY MEDICINE

## 2022-01-07 RX ORDER — ATORVASTATIN CALCIUM 10 MG/1
10 TABLET, FILM COATED ORAL DAILY
Qty: 90 TABLET | Refills: 3 | Status: SHIPPED | OUTPATIENT
Start: 2022-01-07 | End: 2022-02-04 | Stop reason: SDUPTHER

## 2022-01-07 RX ORDER — FLUOXETINE HYDROCHLORIDE 20 MG/1
20 CAPSULE ORAL DAILY
Qty: 30 CAPSULE | Refills: 1 | Status: SHIPPED | OUTPATIENT
Start: 2022-01-07 | End: 2022-02-04

## 2022-01-07 RX ORDER — FLUOXETINE HYDROCHLORIDE 20 MG/1
20 CAPSULE ORAL DAILY
Qty: 30 CAPSULE | Refills: 1 | Status: SHIPPED | OUTPATIENT
Start: 2022-01-07 | End: 2022-01-07 | Stop reason: SDUPTHER

## 2022-01-07 RX ORDER — LEVOTHYROXINE SODIUM 88 UG/1
88 TABLET ORAL
Qty: 90 TABLET | Refills: 3 | Status: SHIPPED | OUTPATIENT
Start: 2022-01-07 | End: 2022-02-04 | Stop reason: SDUPTHER

## 2022-01-07 RX ORDER — LOSARTAN POTASSIUM 25 MG/1
12.5 TABLET ORAL DAILY
Qty: 45 TABLET | Refills: 3 | Status: SHIPPED | OUTPATIENT
Start: 2022-01-07 | End: 2022-02-04 | Stop reason: SDUPTHER

## 2022-01-07 RX ORDER — TRIAMTERENE AND HYDROCHLOROTHIAZIDE 37.5; 25 MG/1; MG/1
1 CAPSULE ORAL EVERY MORNING
Qty: 90 CAPSULE | Refills: 3 | Status: SHIPPED | OUTPATIENT
Start: 2022-01-07 | End: 2022-02-04 | Stop reason: SDUPTHER

## 2022-01-07 ASSESSMENT — FIBROSIS 4 INDEX: FIB4 SCORE: 2.71

## 2022-01-07 ASSESSMENT — PATIENT HEALTH QUESTIONNAIRE - PHQ9: CLINICAL INTERPRETATION OF PHQ2 SCORE: 0

## 2022-01-07 ASSESSMENT — ENCOUNTER SYMPTOMS
NERVOUS/ANXIOUS: 1
CONSTITUTIONAL NEGATIVE: 1

## 2022-01-07 NOTE — PROGRESS NOTES
Subjective:   Alexsandra Woods is a 76 y.o. female here for the evaluation and management of Follow-Up (anxiety issues )    Hypertension-stable and well controlled    Hyperlipidemia-reviewed and discussed recent laboratory studies with stable findings    Hypothyroidism-reviewed and discussed labs and noted elevation in TSH.  Patient reports she feels fine on her thyroid and she has a normal free T4 therefore we elected to maintain her current dosage    Impaired fasting glucose-reviewed and discussed labs and treatment options.  She is elected aggressive lifestyle changes for management of blood sugar    Prior history of breast cancer-reviewed and discussed current [place] recommendations for genetic counseling, after this discussion she elected to decline this recommendation.    Anxiety-patient reports situational anxiety related to her daughter staying with her for an extended time.,  She also thinks it has been exacerbated by recent alf.  We discussed treatment options and she and her  would like her to start on a medication.  Recommended fluoxetine  Problem   Ifg (Impaired Fasting Glucose)   Hypertension (Resolved)   S/P Colonoscopy (Resolved)    Normal, 2005 (approx) Misti LEBLANC.     Shingles (Resolved)   Breast cancer (HCC) (Resolved)    2003, Dr. Ramesh, oncologist.         Review of Systems   Constitutional: Negative.    Psychiatric/Behavioral: The patient is nervous/anxious.        Current Outpatient Medications   Medication Sig Dispense Refill   • triamterene/hctz (MAXZIDE-25/DYAZIDE) 37.5-25 MG Cap Take 1 Capsule by mouth every morning. 90 Capsule 3   • losartan (COZAAR) 25 MG Tab Take 0.5 Tablets by mouth every day. 45 Tablet 3   • levothyroxine (SYNTHROID) 88 MCG Tab Take 1 Tablet by mouth every morning on an empty stomach. 90 Tablet 3   • atorvastatin (LIPITOR) 10 MG Tab Take 1 Tablet by mouth every day. 90 Tablet 3   • FLUoxetine (PROZAC) 20 MG Cap Take 1 Capsule by mouth every  day. 30 Capsule 1   • ascorbic acid (VITAMIN C) 100 MG tablet Take 1,000 mg by mouth every day.     • cholecalciferol (VITAMIN D3) 5000 UNIT Cap Take 5,000 Units by mouth every day.     • B Complex-C (SUPER B COMPLEX PO) Take 1 Tab by mouth every day.     • Glucosamine HCl (GLUCOSAMINE PO) Take 1 Tab by mouth every day.     • multivitamin (THERAGRAN) Tab Take 1 Tab by mouth every day.       No current facility-administered medications for this visit.     Allergies  Lisinopril and Other drug    Past Medical History:   Diagnosis Date   • Allergic rhinitis due to pollen    • Anemia     from chemo   • Breast cancer (HCC)     right, chemotherapy   • Cancer (HCC) 2003    breast RIGHT SIDE   • Cataract     Fausto IOL   • High cholesterol    • HTN (hypertension)    • Hyperlipidemia 11/30/2020   • Hypothyroid    • Pneumonia 1969   • Rotator cuff disorder    • Shingles    • Tinnitus      Patient Active Problem List    Diagnosis Date Noted   • IFG (impaired fasting glucose) 11/30/2020   • Hyperlipidemia 11/30/2020   • Acquired trigger finger 11/30/2020   • Other specified hypothyroidism 11/30/2020   • Encounter for general adult medical examination without abnormal findings 11/30/2020   • Carpal tunnel syndrome on left 10/03/2017   • History of breast cancer 08/17/2012   • Vitamin d deficiency 12/15/2011   • Trigger thumb 12/15/2011   • Allergic rhinitis due to pollen    • Anemia    • Tinnitus    • Hypothyroidism (acquired) 07/28/2011   • Essential hypertension 03/15/2010   • Routine health maintenance 03/15/2010       Past Surgical History  Past Surgical History:   Procedure Laterality Date   • CARPAL TUNNEL RELEASE Left 10/3/2017    Procedure: CARPAL TUNNEL RELEASE;  Surgeon: Sarita Madrid M.D.;  Location: SURGERY [facility];  Service: Orthopedics   • CATARACT EXTRACTION WITH IOL Bilateral 7/2015   • CARPAL TUNNEL RELEASE  2015   • LAMINOTOMY  11/18/2009    LEFT Performed by SARITA BURKS at [facility]  "  • ROTATOR CUFF REPAIR  2/5/2009    Performed by OSGOOD, PATRICK J at SURGERY [facility]   • SHOULDER DECOMPRESSION  2/5/2009    Performed by OSGOOD, PATRICK J at SURGERY [facility]   • ROTATOR CUFF REPAIR Right 2009   • KNEE ARTHROSCOPY Right 2005   • BREAST RECONSTRUCTION  6/2004   • MASTECTOMY Bilateral 2003     excision lymph nodes right axillary   • CATH, PORT-A-CATH  2003   • BREAST IMPLANT REMOVAL      expanders removed   • CYST EXCISION     • LYMPH NODE EXCISION Right     arm   • OTHER SURGICAL PROCEDURE      9/2003 port placed, 3/2004 port removed        Objective:     Vitals:    01/07/22 1510   BP: 138/71   BP Location: Right arm   Patient Position: Sitting   BP Cuff Size: Adult   Pulse: 74   Resp: 14   Temp: (!) 35.3 °C (95.5 °F)   TempSrc: Temporal   SpO2: 94%   Weight: 59 kg (130 lb)   Height: 1.575 m (5' 2\")     Body mass index is 23.78 kg/m².     Physical Exam  Constitutional:       Appearance: Normal appearance.   HENT:      Head: Normocephalic.   Eyes:      Extraocular Movements: Extraocular movements intact.      Conjunctiva/sclera: Conjunctivae normal.   Cardiovascular:      Rate and Rhythm: Normal rate and regular rhythm.      Heart sounds: Normal heart sounds.   Pulmonary:      Effort: Pulmonary effort is normal.      Breath sounds: Normal breath sounds.   Skin:     General: Skin is warm and dry.   Neurological:      Mental Status: She is alert and oriented to person, place, and time. Mental status is at baseline.   Psychiatric:         Mood and Affect: Mood normal.         Behavior: Behavior normal.         Assessment and Plan:   Alexsandra Woods is a 76 y.o. female with a Follow-Up (anxiety issues )     The following was discussed with the patient today.    Problem List Items Addressed This Visit     Essential hypertension    Relevant Medications    triamterene/hctz (MAXZIDE-25/DYAZIDE) 37.5-25 MG Cap    losartan (COZAAR) 25 MG Tab    atorvastatin (LIPITOR) 10 MG Tab    " Routine health maintenance    Relevant Orders    COLOGUARD (FIT DNA)    Hypothyroidism (acquired)    Relevant Medications    levothyroxine (SYNTHROID) 88 MCG Tab    IFG (impaired fasting glucose)    Hyperlipidemia    Relevant Medications    triamterene/hctz (MAXZIDE-25/DYAZIDE) 37.5-25 MG Cap    losartan (COZAAR) 25 MG Tab    atorvastatin (LIPITOR) 10 MG Tab    Other specified hypothyroidism    Relevant Medications    levothyroxine (SYNTHROID) 88 MCG Tab      Other Visit Diagnoses     Other specified anxiety disorders        Relevant Medications    FLUoxetine (PROZAC) 20 MG Cap        Medications reviewed and refills provided, reviewed and discussed laboratory studies with the patient and her , treatment options discussed for management of anxiety and we elected a trial of fluoxetine with close follow-up in 3 to 4 weeks.  Discussed genetic counseling elected to defer.  Followup: Return in about 4 weeks (around 2/4/2022).    Adalberto Paris M.D.    Please note that this dictation was created using voice recognition software. I have made every reasonable attempt to correct obvious errors, but I expect that there are errors of grammar and possibly content that I did not discover before finalizing the note.

## 2022-01-31 DIAGNOSIS — Z00.00 ROUTINE HEALTH MAINTENANCE: ICD-10-CM

## 2022-02-04 ENCOUNTER — OFFICE VISIT (OUTPATIENT)
Dept: MEDICAL GROUP | Facility: OTHER | Age: 77
End: 2022-02-04
Payer: MEDICARE

## 2022-02-04 VITALS
DIASTOLIC BLOOD PRESSURE: 90 MMHG | TEMPERATURE: 97.4 F | WEIGHT: 130 LBS | HEART RATE: 75 BPM | RESPIRATION RATE: 14 BRPM | HEIGHT: 62 IN | OXYGEN SATURATION: 94 % | BODY MASS INDEX: 23.92 KG/M2 | SYSTOLIC BLOOD PRESSURE: 150 MMHG

## 2022-02-04 DIAGNOSIS — I10 ESSENTIAL HYPERTENSION: ICD-10-CM

## 2022-02-04 DIAGNOSIS — E03.9 HYPOTHYROIDISM (ACQUIRED): ICD-10-CM

## 2022-02-04 DIAGNOSIS — E78.2 MIXED HYPERLIPIDEMIA: ICD-10-CM

## 2022-02-04 DIAGNOSIS — R19.5 POSITIVE COLORECTAL CANCER SCREENING USING COLOGUARD TEST: ICD-10-CM

## 2022-02-04 DIAGNOSIS — R73.01 IFG (IMPAIRED FASTING GLUCOSE): ICD-10-CM

## 2022-02-04 PROCEDURE — 99214 OFFICE O/P EST MOD 30 MIN: CPT | Performed by: FAMILY MEDICINE

## 2022-02-04 RX ORDER — ATORVASTATIN CALCIUM 10 MG/1
10 TABLET, FILM COATED ORAL DAILY
Qty: 90 TABLET | Refills: 3 | Status: SHIPPED | OUTPATIENT
Start: 2022-02-04 | End: 2023-05-08 | Stop reason: SDUPTHER

## 2022-02-04 RX ORDER — LOSARTAN POTASSIUM 25 MG/1
12.5 TABLET ORAL DAILY
Qty: 45 TABLET | Refills: 3 | Status: SHIPPED | OUTPATIENT
Start: 2022-02-04 | End: 2023-04-27 | Stop reason: SDUPTHER

## 2022-02-04 RX ORDER — TRIAMTERENE AND HYDROCHLOROTHIAZIDE 37.5; 25 MG/1; MG/1
1 CAPSULE ORAL EVERY MORNING
Qty: 90 CAPSULE | Refills: 3 | Status: SHIPPED | OUTPATIENT
Start: 2022-02-04 | End: 2023-05-04 | Stop reason: SDUPTHER

## 2022-02-04 RX ORDER — LEVOTHYROXINE SODIUM 88 UG/1
88 TABLET ORAL
Qty: 90 TABLET | Refills: 3 | Status: SHIPPED | OUTPATIENT
Start: 2022-02-04 | End: 2023-04-27 | Stop reason: SDUPTHER

## 2022-02-04 ASSESSMENT — ENCOUNTER SYMPTOMS
CARDIOVASCULAR NEGATIVE: 1
CONSTITUTIONAL NEGATIVE: 1
RESPIRATORY NEGATIVE: 1

## 2022-02-04 ASSESSMENT — FIBROSIS 4 INDEX: FIB4 SCORE: 2.71

## 2022-02-05 NOTE — PROGRESS NOTES
Subjective:   Alexsandra Woods is a 76 y.o. female here for the evaluation and management of Follow-Up (meds)    Hypertension-elevated today, discussed treatment options including adjusting medications and home monitoring.  Their preference was home monitoring and they will keep me updated    Hyperlipidemia-stable    Hypothyroidism-stable    Impaired fasting glucose-stable with ongoing lifestyle changes    Recent positive colorectal cancer screening Cologuard test-pending evaluation by gastroenterology, referral previously placed and a copy was provided so that they can contact GI  No problems updated.    Review of Systems   Constitutional: Negative.    Respiratory: Negative.    Cardiovascular: Negative.        Current Outpatient Medications   Medication Sig Dispense Refill   • triamterene/hctz (MAXZIDE-25/DYAZIDE) 37.5-25 MG Cap Take 1 Capsule by mouth every morning. 90 Capsule 3   • losartan (COZAAR) 25 MG Tab Take 0.5 Tablets by mouth every day. 45 Tablet 3   • levothyroxine (SYNTHROID) 88 MCG Tab Take 1 Tablet by mouth every morning on an empty stomach. 90 Tablet 3   • atorvastatin (LIPITOR) 10 MG Tab Take 1 Tablet by mouth every day. 90 Tablet 3   • FLUoxetine (PROZAC) 20 MG Cap Take 1 Capsule by mouth every day. 30 Capsule 1   • ascorbic acid (VITAMIN C) 100 MG tablet Take 1,000 mg by mouth every day.     • cholecalciferol (VITAMIN D3) 5000 UNIT Cap Take 5,000 Units by mouth every day.     • B Complex-C (SUPER B COMPLEX PO) Take 1 Tab by mouth every day.     • Glucosamine HCl (GLUCOSAMINE PO) Take 1 Tab by mouth every day.     • multivitamin (THERAGRAN) Tab Take 1 Tab by mouth every day.       No current facility-administered medications for this visit.     Allergies  Lisinopril and Other drug    Past Medical History:   Diagnosis Date   • Allergic rhinitis due to pollen    • Anemia     from chemo   • Breast cancer (HCC)     right, chemotherapy   • Cancer (HCC) 2003    breast RIGHT SIDE   • Cataract      Fausto IOL   • High cholesterol    • HTN (hypertension)    • Hyperlipidemia 11/30/2020   • Hypothyroid    • Pneumonia 1969   • Rotator cuff disorder    • Shingles    • Tinnitus      Patient Active Problem List    Diagnosis Date Noted   • IFG (impaired fasting glucose) 11/30/2020   • Hyperlipidemia 11/30/2020   • Acquired trigger finger 11/30/2020   • Other specified hypothyroidism 11/30/2020   • Encounter for general adult medical examination without abnormal findings 11/30/2020   • Carpal tunnel syndrome on left 10/03/2017   • History of breast cancer 08/17/2012   • Vitamin d deficiency 12/15/2011   • Trigger thumb 12/15/2011   • Allergic rhinitis due to pollen    • Anemia    • Tinnitus    • Hypothyroidism (acquired) 07/28/2011   • Essential hypertension 03/15/2010   • Routine health maintenance 03/15/2010       Past Surgical History  Past Surgical History:   Procedure Laterality Date   • CARPAL TUNNEL RELEASE Left 10/3/2017    Procedure: CARPAL TUNNEL RELEASE;  Surgeon: Sarita Madrid M.D.;  Location: [facility];  Service: Orthopedics   • CATARACT EXTRACTION WITH IOL Bilateral 7/2015   • CARPAL TUNNEL RELEASE  2015   • LAMINOTOMY  11/18/2009    LEFT Performed by SARITA BURKS at SURGERY [facility]   • ROTATOR CUFF REPAIR  2/5/2009    Performed by OSGOOD, PATRICK J at SURGERY [facility]   • SHOULDER DECOMPRESSION  2/5/2009    Performed by OSGOOD, PATRICK J at [facility]   • ROTATOR CUFF REPAIR Right 2009   • KNEE ARTHROSCOPY Right 2005   • BREAST RECONSTRUCTION  6/2004   • MASTECTOMY Bilateral 2003     excision lymph nodes right axillary   • CATH, PORT-A-CATH  2003   • BREAST IMPLANT REMOVAL      expanders removed   • CYST EXCISION     • LYMPH NODE EXCISION Right     arm   • OTHER SURGICAL PROCEDURE      9/2003 port placed, 3/2004 port removed        Objective:     Vitals:    02/04/22 1540   BP: 150/90   BP Location: Left arm   Patient Position: Sitting   BP Cuff Size:  "Adult   Pulse: 75   Resp: 14   Temp: 36.3 °C (97.4 °F)   TempSrc: Temporal   SpO2: 94%   Weight: 59 kg (130 lb)   Height: 1.575 m (5' 2\")     Body mass index is 23.78 kg/m².     Physical Exam  Constitutional:       Appearance: Normal appearance.   HENT:      Head: Normocephalic.   Eyes:      Extraocular Movements: Extraocular movements intact.      Conjunctiva/sclera: Conjunctivae normal.   Cardiovascular:      Rate and Rhythm: Normal rate and regular rhythm.      Heart sounds: Normal heart sounds.   Pulmonary:      Effort: Pulmonary effort is normal.      Breath sounds: Normal breath sounds.   Skin:     General: Skin is warm and dry.   Neurological:      Mental Status: She is alert and oriented to person, place, and time. Mental status is at baseline.   Psychiatric:         Mood and Affect: Mood normal.         Behavior: Behavior normal.         Assessment and Plan:   Alexsandra Woods is a 76 y.o. female with a Follow-Up (meds)     The following was discussed with the patient today.    Problem List Items Addressed This Visit     None        Medications reviewed, treatment options discussed regarding management of hypertension with patient electing home monitoring and keeping me informed.  I informed her if her blood pressure remains elevated we will consider an adjustment in her medications.  I confirmed a GI referral has been previously placed and provided a copy so that they can expedite an evaluation by gastroenterology.  Stable with chronic medical problems, follow-up in 3 to 6 months  Followup: No follow-ups on file.    Adalberto Paris M.D.    Please note that this dictation was created using voice recognition software. I have made every reasonable attempt to correct obvious errors, but I expect that there are errors of grammar and possibly content that I did not discover before finalizing the note.  "

## 2022-02-15 ENCOUNTER — TELEPHONE (OUTPATIENT)
Dept: MEDICAL GROUP | Facility: OTHER | Age: 77
End: 2022-02-15

## 2022-02-15 DIAGNOSIS — Z00.00 ROUTINE HEALTH MAINTENANCE: ICD-10-CM

## 2022-02-17 ENCOUNTER — APPOINTMENT (RX ONLY)
Dept: URBAN - METROPOLITAN AREA CLINIC 4 | Facility: CLINIC | Age: 77
Setting detail: DERMATOLOGY
End: 2022-02-17

## 2022-02-17 DIAGNOSIS — D22 MELANOCYTIC NEVI: ICD-10-CM

## 2022-02-17 DIAGNOSIS — D18.0 HEMANGIOMA: ICD-10-CM

## 2022-02-17 DIAGNOSIS — L81.4 OTHER MELANIN HYPERPIGMENTATION: ICD-10-CM

## 2022-02-17 DIAGNOSIS — L11.1 TRANSIENT ACANTHOLYTIC DERMATOSIS [GROVER]: ICD-10-CM

## 2022-02-17 DIAGNOSIS — L82.1 OTHER SEBORRHEIC KERATOSIS: ICD-10-CM

## 2022-02-17 PROBLEM — D18.01 HEMANGIOMA OF SKIN AND SUBCUTANEOUS TISSUE: Status: ACTIVE | Noted: 2022-02-17

## 2022-02-17 PROBLEM — D22.61 MELANOCYTIC NEVI OF RIGHT UPPER LIMB, INCLUDING SHOULDER: Status: ACTIVE | Noted: 2022-02-17

## 2022-02-17 PROBLEM — D22.72 MELANOCYTIC NEVI OF LEFT LOWER LIMB, INCLUDING HIP: Status: ACTIVE | Noted: 2022-02-17

## 2022-02-17 PROBLEM — D22.62 MELANOCYTIC NEVI OF LEFT UPPER LIMB, INCLUDING SHOULDER: Status: ACTIVE | Noted: 2022-02-17

## 2022-02-17 PROBLEM — D22.71 MELANOCYTIC NEVI OF RIGHT LOWER LIMB, INCLUDING HIP: Status: ACTIVE | Noted: 2022-02-17

## 2022-02-17 PROBLEM — D22.5 MELANOCYTIC NEVI OF TRUNK: Status: ACTIVE | Noted: 2022-02-17

## 2022-02-17 PROCEDURE — ? PRESCRIPTION

## 2022-02-17 PROCEDURE — ? COUNSELING

## 2022-02-17 PROCEDURE — 99203 OFFICE O/P NEW LOW 30 MIN: CPT

## 2022-02-17 RX ORDER — TRIAMCINOLONE ACETONIDE 1 MG/G
CREAM TOPICAL BID
Qty: 453.6 | Refills: 1 | Status: ERX | COMMUNITY
Start: 2022-02-17

## 2022-02-17 RX ADMIN — TRIAMCINOLONE ACETONIDE: 1 CREAM TOPICAL at 00:00

## 2022-02-17 ASSESSMENT — LOCATION DETAILED DESCRIPTION DERM
LOCATION DETAILED: RIGHT VENTRAL PROXIMAL FOREARM
LOCATION DETAILED: SUBXIPHOID
LOCATION DETAILED: LEFT INFERIOR LATERAL MIDBACK
LOCATION DETAILED: LEFT POPLITEAL SKIN
LOCATION DETAILED: LEFT PROXIMAL PRETIBIAL REGION
LOCATION DETAILED: RIGHT ANTERIOR DISTAL UPPER ARM
LOCATION DETAILED: MIDDLE STERNUM
LOCATION DETAILED: LEFT VENTRAL LATERAL PROXIMAL FOREARM
LOCATION DETAILED: RIGHT INFERIOR CENTRAL MALAR CHEEK
LOCATION DETAILED: LEFT INFERIOR MEDIAL UPPER BACK
LOCATION DETAILED: RIGHT ANTERIOR PROXIMAL UPPER ARM
LOCATION DETAILED: RIGHT PROXIMAL PRETIBIAL REGION
LOCATION DETAILED: LEFT SUPERIOR MEDIAL MIDBACK
LOCATION DETAILED: RIGHT ANTECUBITAL SKIN
LOCATION DETAILED: RIGHT DISTAL POSTERIOR THIGH
LOCATION DETAILED: LEFT ANTERIOR PROXIMAL UPPER ARM
LOCATION DETAILED: LEFT VENTRAL PROXIMAL FOREARM
LOCATION DETAILED: LEFT LATERAL PROXIMAL PRETIBIAL REGION
LOCATION DETAILED: INFERIOR THORACIC SPINE
LOCATION DETAILED: RIGHT MEDIAL SUPERIOR CHEST
LOCATION DETAILED: LEFT DISTAL POSTERIOR THIGH
LOCATION DETAILED: XIPHOID
LOCATION DETAILED: LEFT ANTERIOR DISTAL UPPER ARM
LOCATION DETAILED: RIGHT POPLITEAL SKIN

## 2022-02-17 ASSESSMENT — LOCATION SIMPLE DESCRIPTION DERM
LOCATION SIMPLE: CHEST
LOCATION SIMPLE: LEFT PRETIBIAL REGION
LOCATION SIMPLE: RIGHT POPLITEAL SKIN
LOCATION SIMPLE: LEFT POSTERIOR THIGH
LOCATION SIMPLE: LEFT UPPER ARM
LOCATION SIMPLE: LEFT UPPER BACK
LOCATION SIMPLE: RIGHT FOREARM
LOCATION SIMPLE: ABDOMEN
LOCATION SIMPLE: LEFT POPLITEAL SKIN
LOCATION SIMPLE: UPPER BACK
LOCATION SIMPLE: RIGHT CHEEK
LOCATION SIMPLE: RIGHT POSTERIOR THIGH
LOCATION SIMPLE: LEFT LOWER BACK
LOCATION SIMPLE: LEFT FOREARM
LOCATION SIMPLE: RIGHT PRETIBIAL REGION
LOCATION SIMPLE: RIGHT UPPER ARM

## 2022-02-17 ASSESSMENT — LOCATION ZONE DERM
LOCATION ZONE: LEG
LOCATION ZONE: ARM
LOCATION ZONE: FACE
LOCATION ZONE: TRUNK

## 2022-11-09 ENCOUNTER — PATIENT MESSAGE (OUTPATIENT)
Dept: HEALTH INFORMATION MANAGEMENT | Facility: OTHER | Age: 77
End: 2022-11-09

## 2023-02-23 ENCOUNTER — APPOINTMENT (RX ONLY)
Dept: URBAN - METROPOLITAN AREA CLINIC 4 | Facility: CLINIC | Age: 78
Setting detail: DERMATOLOGY
End: 2023-02-23

## 2023-02-23 DIAGNOSIS — D22 MELANOCYTIC NEVI: ICD-10-CM

## 2023-02-23 DIAGNOSIS — L82.1 OTHER SEBORRHEIC KERATOSIS: ICD-10-CM

## 2023-02-23 DIAGNOSIS — L81.4 OTHER MELANIN HYPERPIGMENTATION: ICD-10-CM

## 2023-02-23 DIAGNOSIS — L85.3 XEROSIS CUTIS: ICD-10-CM

## 2023-02-23 DIAGNOSIS — D18.0 HEMANGIOMA: ICD-10-CM

## 2023-02-23 PROBLEM — D18.01 HEMANGIOMA OF SKIN AND SUBCUTANEOUS TISSUE: Status: ACTIVE | Noted: 2023-02-23

## 2023-02-23 PROBLEM — D22.72 MELANOCYTIC NEVI OF LEFT LOWER LIMB, INCLUDING HIP: Status: ACTIVE | Noted: 2023-02-23

## 2023-02-23 PROBLEM — D22.71 MELANOCYTIC NEVI OF RIGHT LOWER LIMB, INCLUDING HIP: Status: ACTIVE | Noted: 2023-02-23

## 2023-02-23 PROBLEM — D22.5 MELANOCYTIC NEVI OF TRUNK: Status: ACTIVE | Noted: 2023-02-23

## 2023-02-23 PROBLEM — D22.62 MELANOCYTIC NEVI OF LEFT UPPER LIMB, INCLUDING SHOULDER: Status: ACTIVE | Noted: 2023-02-23

## 2023-02-23 PROBLEM — D22.61 MELANOCYTIC NEVI OF RIGHT UPPER LIMB, INCLUDING SHOULDER: Status: ACTIVE | Noted: 2023-02-23

## 2023-02-23 PROCEDURE — 99213 OFFICE O/P EST LOW 20 MIN: CPT

## 2023-02-23 PROCEDURE — ? COUNSELING

## 2023-02-23 ASSESSMENT — LOCATION DETAILED DESCRIPTION DERM
LOCATION DETAILED: RIGHT ANTECUBITAL SKIN
LOCATION DETAILED: RIGHT INFERIOR CENTRAL MALAR CHEEK
LOCATION DETAILED: LEFT VENTRAL PROXIMAL FOREARM
LOCATION DETAILED: LEFT VENTRAL LATERAL PROXIMAL FOREARM
LOCATION DETAILED: RIGHT PROXIMAL PRETIBIAL REGION
LOCATION DETAILED: RIGHT ANTERIOR PROXIMAL UPPER ARM
LOCATION DETAILED: LEFT POPLITEAL SKIN
LOCATION DETAILED: RIGHT VENTRAL PROXIMAL FOREARM
LOCATION DETAILED: LEFT ANTERIOR DISTAL UPPER ARM
LOCATION DETAILED: LEFT ANTERIOR PROXIMAL UPPER ARM
LOCATION DETAILED: LEFT INFERIOR LATERAL MIDBACK
LOCATION DETAILED: RIGHT POPLITEAL SKIN
LOCATION DETAILED: XIPHOID
LOCATION DETAILED: RIGHT MEDIAL SUPERIOR CHEST
LOCATION DETAILED: LEFT DISTAL POSTERIOR THIGH
LOCATION DETAILED: RIGHT ANTERIOR DISTAL UPPER ARM
LOCATION DETAILED: LEFT PROXIMAL PRETIBIAL REGION
LOCATION DETAILED: LEFT INFERIOR MEDIAL UPPER BACK
LOCATION DETAILED: MIDDLE STERNUM
LOCATION DETAILED: SUBXIPHOID
LOCATION DETAILED: RIGHT DISTAL POSTERIOR THIGH
LOCATION DETAILED: LEFT LATERAL PROXIMAL PRETIBIAL REGION
LOCATION DETAILED: LEFT SUPERIOR MEDIAL MIDBACK
LOCATION DETAILED: INFERIOR THORACIC SPINE

## 2023-02-23 ASSESSMENT — LOCATION SIMPLE DESCRIPTION DERM
LOCATION SIMPLE: LEFT UPPER ARM
LOCATION SIMPLE: CHEST
LOCATION SIMPLE: ABDOMEN
LOCATION SIMPLE: RIGHT CHEEK
LOCATION SIMPLE: RIGHT POSTERIOR THIGH
LOCATION SIMPLE: RIGHT PRETIBIAL REGION
LOCATION SIMPLE: LEFT LOWER BACK
LOCATION SIMPLE: LEFT PRETIBIAL REGION
LOCATION SIMPLE: LEFT FOREARM
LOCATION SIMPLE: RIGHT UPPER ARM
LOCATION SIMPLE: RIGHT POPLITEAL SKIN
LOCATION SIMPLE: LEFT UPPER BACK
LOCATION SIMPLE: LEFT POSTERIOR THIGH
LOCATION SIMPLE: LEFT POPLITEAL SKIN
LOCATION SIMPLE: UPPER BACK
LOCATION SIMPLE: RIGHT FOREARM

## 2023-02-23 ASSESSMENT — LOCATION ZONE DERM
LOCATION ZONE: TRUNK
LOCATION ZONE: ARM
LOCATION ZONE: FACE
LOCATION ZONE: LEG

## 2023-04-11 ENCOUNTER — OFFICE VISIT (OUTPATIENT)
Dept: MEDICAL GROUP | Facility: CLINIC | Age: 78
End: 2023-04-11
Payer: MEDICARE

## 2023-04-11 DIAGNOSIS — E11.9 TYPE 2 DIABETES MELLITUS WITHOUT COMPLICATION, WITHOUT LONG-TERM CURRENT USE OF INSULIN (HCC): ICD-10-CM

## 2023-04-11 DIAGNOSIS — I10 ESSENTIAL HYPERTENSION: ICD-10-CM

## 2023-04-11 DIAGNOSIS — E03.9 HYPOTHYROIDISM (ACQUIRED): ICD-10-CM

## 2023-04-11 DIAGNOSIS — Z00.00 ROUTINE HEALTH MAINTENANCE: ICD-10-CM

## 2023-04-11 DIAGNOSIS — F03.A0 MILD DEMENTIA WITHOUT BEHAVIORAL DISTURBANCE, PSYCHOTIC DISTURBANCE, MOOD DISTURBANCE, OR ANXIETY, UNSPECIFIED DEMENTIA TYPE (HCC): ICD-10-CM

## 2023-04-11 DIAGNOSIS — R73.01 IFG (IMPAIRED FASTING GLUCOSE): ICD-10-CM

## 2023-04-11 DIAGNOSIS — M79.644 FINGER PAIN, RIGHT: ICD-10-CM

## 2023-04-11 DIAGNOSIS — E78.5 HYPERLIPIDEMIA, UNSPECIFIED HYPERLIPIDEMIA TYPE: ICD-10-CM

## 2023-04-11 LAB
HBA1C MFR BLD: 7.6 % (ref ?–5.8)
POCT INT CON NEG: NEGATIVE
POCT INT CON POS: POSITIVE

## 2023-04-11 PROCEDURE — 83036 HEMOGLOBIN GLYCOSYLATED A1C: CPT | Mod: GC | Performed by: STUDENT IN AN ORGANIZED HEALTH CARE EDUCATION/TRAINING PROGRAM

## 2023-04-11 PROCEDURE — 99214 OFFICE O/P EST MOD 30 MIN: CPT | Mod: GC | Performed by: STUDENT IN AN ORGANIZED HEALTH CARE EDUCATION/TRAINING PROGRAM

## 2023-04-11 ASSESSMENT — FIBROSIS 4 INDEX: FIB4 SCORE: 2.75

## 2023-04-11 NOTE — PROGRESS NOTES
Subjective:     CC: Re-establish care    HPI:   Alexsandra presents today with       Problem   Finger Pain, Right    Patient complaining of difficulty flexing her right ring finger for the past couple of months.  She has significant pain in the palmar aspect of the metacarpal joint of the ring finger as well.  She has not been doing anything at home for treatment.     Type 2 Diabetes Mellitus Without Complication, Without Long-Term Current Use of Insulin (Hcc)   Mild Dementia (Hcc)    Patient states she started developing signs of dementia 1 year ago.  She is taking over-the-counter Prevagen.  She has no difficulty with activities of daily living, but more so has difficulty with memory retrieval.  Denies any instances in which she find herself lost while out and about or difficulty recalling directions while driving.     Essential Hypertension    BP at home: 130s/80s. Checks weekly.  No headaches, palpitations, or shortness of breath     Routine Health Maintenance       Current Outpatient Medications Ordered in Epic   Medication Sig Dispense Refill    dapagliflozin propanediol (FARXIGA) 5 MG Tab Take 1 Tablet by mouth every day. 90 Tablet 3    triamterene/hctz (MAXZIDE-25/DYAZIDE) 37.5-25 MG Cap Take 1 Capsule by mouth every morning. 90 Capsule 3    losartan (COZAAR) 25 MG Tab Take 0.5 Tablets by mouth every day. 45 Tablet 3    levothyroxine (SYNTHROID) 88 MCG Tab Take 1 Tablet by mouth every morning on an empty stomach. 90 Tablet 3    atorvastatin (LIPITOR) 10 MG Tab Take 1 Tablet by mouth every day. 90 Tablet 3    ascorbic acid (VITAMIN C) 100 MG tablet Take 1,000 mg by mouth every day.      cholecalciferol (VITAMIN D3) 5000 UNIT Cap Take 5,000 Units by mouth every day.      B Complex-C (SUPER B COMPLEX PO) Take 1 Tab by mouth every day.      Glucosamine HCl (GLUCOSAMINE PO) Take 1 Tab by mouth every day.      multivitamin (THERAGRAN) Tab Take 1 Tab by mouth every day.       No current [facility]-ordered facility-administered  "medications on file.           Objective:     Exam:  BP (P) 124/72 (BP Location: Left arm, Patient Position: Sitting, BP Cuff Size: Adult)   Pulse (P) 76   Temp (P) 36.3 °C (97.3 °F) (Temporal)   Ht (P) 1.575 m (5' 2\")   Wt (P) 60.1 kg (132 lb 9.6 oz)   SpO2 (P) 96%   BMI (P) 24.25 kg/m²  Body mass index is 24.25 kg/m² (pended).    General: Normal appearing. No distress.  Pulmonary: Clear to ausculation.  Normal effort. No rales, ronchi, or wheezing.  Cardiovascular: Regular rate and rhythm without murmur.   Musculoskeletal: Normal gait. No extremity cyanosis, clubbing, or edema.  Tenderness to the palmar aspect of the right ring finger, difficulty with flexion of the right ring finger, no obvious swelling or erythema  Psych: Normal mood and affect. Alert and oriented x3. Judgment and insight is normal.      Assessment & Plan:     77 y.o. female with the following -     Problem List Items Addressed This Visit       Essential hypertension    Relevant Orders    Comp Metabolic Panel    Routine health maintenance     Patient is up-to-date on:  -Colon cancer screening completed last year with colonoscopy  -Vaccines: Completed Zostavax in 2011, recommended Shingrix now as it is more effective, up-to-date on COVID boosters, pneumonia vaccine completed in 2011    Routine lab work ordered given history of hypertension and type 2 diabetes         Hypothyroidism (acquired)    Relevant Orders    CBC WITH DIFFERENTIAL    TSH WITH REFLEX TO FT4    IFG (impaired fasting glucose)    Relevant Orders    POCT  A1C (Completed)    Hyperlipidemia    Relevant Orders    Lipid Profile    Finger pain, right     Patient does have difficulty flexing the ring finger when asked to make a fist.  There is tenderness along the ring finger tendon in the distal palmar aspect.  Unable to produce any locking sensation and unable to palpate any obvious fibrous tissue.  Dupuytren's contracture is a consideration, but that would be more likely if " she had difficulty extending the fingers.  Another consideration is trigger finger, but again no locking sensation is appreciated.  There is definitely some tendon inflammation, so recommended Voltaren gel in the meantime.  X-ray of the hand ordered.         Relevant Orders    DX-HAND 2- RIGHT    Type 2 diabetes mellitus without complication, without long-term current use of insulin (HCC)     Patient now with A1c of 7.6, worse compared to last year at 6.9.  She states she does eat sweets quite often and is not currently taking any medication for her diabetes.  Historically, patient took metformin, but tolerated it very poorly, so not interested in restarting.  Will prescribe Farxiga 5 mg, with room to increase dose to 10 mg.  Counseled patient on diabetic diet.  Goal A1c in this age range is 7-8.  More concerned around hypoglycemic episodes.  Appropriately, patient is already on moderate intensity statin.  Lab work ordered.  Patient needs annual diabetic retinopathy screening.  Follow-up in 2-3 months.         Relevant Medications    dapagliflozin propanediol (FARXIGA) 5 MG Tab    Other Relevant Orders    POCT  A1C (Completed)    Mild dementia (HCC)     Discussed with patient that Prevagen has no compelling research to indicate continued use.  Recommended conservative therapy with lifestyle modifications, including healthy nutrition, good sleep hygiene, exercise as tolerated, and cognitive exercises such as word puzzles.  Fortunately, there are no medications that have the potential to worsen dementia on this patient's medication list.              No follow-ups on file.    Elly Rodríguez MD   PGY-3

## 2023-04-12 PROBLEM — F03.A0 MILD DEMENTIA (HCC): Status: ACTIVE | Noted: 2023-04-12

## 2023-04-12 PROBLEM — E11.9 TYPE 2 DIABETES MELLITUS WITHOUT COMPLICATION, WITHOUT LONG-TERM CURRENT USE OF INSULIN (HCC): Status: ACTIVE | Noted: 2023-04-12

## 2023-04-12 PROBLEM — M79.644 FINGER PAIN, RIGHT: Status: ACTIVE | Noted: 2023-04-12

## 2023-04-12 RX ORDER — DAPAGLIFLOZIN 5 MG/1
5 TABLET, FILM COATED ORAL DAILY
Qty: 90 TABLET | Refills: 3 | Status: SHIPPED | OUTPATIENT
Start: 2023-04-12 | End: 2023-06-19

## 2023-04-12 NOTE — ASSESSMENT & PLAN NOTE
Patient now with A1c of 7.6, worse compared to last year at 6.9.  She states she does eat sweets quite often and is not currently taking any medication for her diabetes.  Historically, patient took metformin, but tolerated it very poorly, so not interested in restarting.  Will prescribe Farxiga 5 mg, with room to increase dose to 10 mg.  Counseled patient on diabetic diet.  Goal A1c in this age range is 7-8.  More concerned around hypoglycemic episodes.  Appropriately, patient is already on moderate intensity statin.  Lab work ordered.  Patient needs annual diabetic retinopathy screening.  Follow-up in 2-3 months.

## 2023-04-12 NOTE — ASSESSMENT & PLAN NOTE
Patient does have difficulty flexing the ring finger when asked to make a fist.  There is tenderness along the ring finger tendon in the distal palmar aspect.  Unable to produce any locking sensation and unable to palpate any obvious fibrous tissue.  Dupuytren's contracture is a consideration, but that would be more likely if she had difficulty extending the fingers.  Another consideration is trigger finger, but again no locking sensation is appreciated.  There is definitely some tendon inflammation, so recommended Voltaren gel in the meantime.  X-ray of the hand ordered.

## 2023-04-12 NOTE — ASSESSMENT & PLAN NOTE
Discussed with patient that Prevagen has no compelling research to indicate continued use.  Recommended conservative therapy with lifestyle modifications, including healthy nutrition, good sleep hygiene, exercise as tolerated, and cognitive exercises such as word puzzles.  Fortunately, there are no medications that have the potential to worsen dementia on this patient's medication list.

## 2023-04-12 NOTE — ASSESSMENT & PLAN NOTE
Patient is up-to-date on:  -Colon cancer screening completed last year with colonoscopy  -Vaccines: Completed Zostavax in 2011, recommended Shingrix now as it is more effective, up-to-date on COVID boosters, pneumonia vaccine completed in 2011    Routine lab work ordered given history of hypertension and type 2 diabetes

## 2023-04-14 ENCOUNTER — APPOINTMENT (OUTPATIENT)
Dept: RADIOLOGY | Facility: MEDICAL CENTER | Age: 78
End: 2023-04-14
Attending: STUDENT IN AN ORGANIZED HEALTH CARE EDUCATION/TRAINING PROGRAM
Payer: MEDICARE

## 2023-04-14 DIAGNOSIS — M79.644 FINGER PAIN, RIGHT: ICD-10-CM

## 2023-04-14 PROCEDURE — 73120 X-RAY EXAM OF HAND: CPT | Mod: RT

## 2023-04-23 ENCOUNTER — PATIENT MESSAGE (OUTPATIENT)
Dept: MEDICAL GROUP | Facility: CLINIC | Age: 78
End: 2023-04-23
Payer: MEDICARE

## 2023-04-23 DIAGNOSIS — E78.49 OTHER HYPERLIPIDEMIA: ICD-10-CM

## 2023-04-23 DIAGNOSIS — R73.01 IFG (IMPAIRED FASTING GLUCOSE): ICD-10-CM

## 2023-04-23 DIAGNOSIS — K76.0 FATTY LIVER: ICD-10-CM

## 2023-04-23 DIAGNOSIS — E11.9 TYPE 2 DIABETES MELLITUS WITHOUT COMPLICATION, WITHOUT LONG-TERM CURRENT USE OF INSULIN (HCC): ICD-10-CM

## 2023-04-23 DIAGNOSIS — Z13.79 GENETIC SCREENING: ICD-10-CM

## 2023-04-23 DIAGNOSIS — I10 ESSENTIAL HYPERTENSION: ICD-10-CM

## 2023-04-27 DIAGNOSIS — E03.9 HYPOTHYROIDISM (ACQUIRED): ICD-10-CM

## 2023-04-27 DIAGNOSIS — I10 ESSENTIAL HYPERTENSION: ICD-10-CM

## 2023-04-27 RX ORDER — LOSARTAN POTASSIUM 25 MG/1
12.5 TABLET ORAL DAILY
Qty: 45 TABLET | Refills: 3 | Status: SHIPPED | OUTPATIENT
Start: 2023-04-27

## 2023-04-27 RX ORDER — LEVOTHYROXINE SODIUM 88 UG/1
88 TABLET ORAL
Qty: 90 TABLET | Refills: 3 | Status: SHIPPED | OUTPATIENT
Start: 2023-04-27 | End: 2023-05-08

## 2023-05-05 ENCOUNTER — HOSPITAL ENCOUNTER (OUTPATIENT)
Dept: LAB | Facility: MEDICAL CENTER | Age: 78
End: 2023-05-05
Attending: STUDENT IN AN ORGANIZED HEALTH CARE EDUCATION/TRAINING PROGRAM
Payer: MEDICARE

## 2023-05-08 DIAGNOSIS — E78.2 MIXED HYPERLIPIDEMIA: ICD-10-CM

## 2023-05-08 RX ORDER — ATORVASTATIN CALCIUM 10 MG/1
10 TABLET, FILM COATED ORAL DAILY
Qty: 90 TABLET | Refills: 3 | Status: SHIPPED | OUTPATIENT
Start: 2023-05-08 | End: 2023-07-25 | Stop reason: SDUPTHER

## 2023-05-22 ENCOUNTER — TELEPHONE (OUTPATIENT)
Dept: MEDICAL GROUP | Facility: CLINIC | Age: 78
End: 2023-05-22
Payer: MEDICARE

## 2023-05-22 DIAGNOSIS — H93.13 TINNITUS OF BOTH EARS: ICD-10-CM

## 2023-05-22 NOTE — TELEPHONE ENCOUNTER
Caller Name: Alexsandra Woods  Call Back Number: [phone] (home) [phone] (work)      How would the patient prefer to be contacted with a response: Phone call OK to leave a detailed message    Referral,  patient has been calling the medical assistant for Dr. Rodríguez,  would like a referral to Dr. Olu Devlin with [facility], patient has been having Ring in the ear.

## 2023-06-12 ENCOUNTER — APPOINTMENT (OUTPATIENT)
Dept: MEDICAL GROUP | Facility: CLINIC | Age: 78
End: 2023-06-12
Payer: MEDICARE

## 2023-06-19 ENCOUNTER — OFFICE VISIT (OUTPATIENT)
Dept: MEDICAL GROUP | Facility: CLINIC | Age: 78
End: 2023-06-19
Payer: MEDICARE

## 2023-06-19 VITALS — BODY MASS INDEX: 21.09 KG/M2 | HEIGHT: 63 IN | WEIGHT: 119 LBS | RESPIRATION RATE: 16 BRPM

## 2023-06-19 DIAGNOSIS — E03.9 HYPOTHYROIDISM (ACQUIRED): ICD-10-CM

## 2023-06-19 DIAGNOSIS — E11.9 TYPE 2 DIABETES MELLITUS WITHOUT COMPLICATION, WITHOUT LONG-TERM CURRENT USE OF INSULIN (HCC): ICD-10-CM

## 2023-06-19 DIAGNOSIS — R21 SKIN RASH: ICD-10-CM

## 2023-06-19 DIAGNOSIS — R41.3 MEMORY PROBLEM: ICD-10-CM
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
## 2023-06-19 DIAGNOSIS — I10 ESSENTIAL HYPERTENSION: ICD-10-CM

## 2023-06-19 DIAGNOSIS — H93.13 TINNITUS OF BOTH EARS: ICD-10-CM

## 2023-06-19 PROCEDURE — 99214 OFFICE O/P EST MOD 30 MIN: CPT | Mod: GC | Performed by: STUDENT IN AN ORGANIZED HEALTH CARE EDUCATION/TRAINING PROGRAM

## 2023-06-19 RX ORDER — TRIAMCINOLONE ACETONIDE 1 MG/G
1 CREAM TOPICAL 2 TIMES DAILY
Qty: 45 G | Refills: 3 | Status: SHIPPED | OUTPATIENT
Start: 2023-06-19 | End: 2023-09-27

## 2023-06-19 ASSESSMENT — PATIENT HEALTH QUESTIONNAIRE - PHQ9: CLINICAL INTERPRETATION OF PHQ2 SCORE: 0

## 2023-06-19 ASSESSMENT — FIBROSIS 4 INDEX: FIB4 SCORE: 2.85

## 2023-06-19 NOTE — PROGRESS NOTES
Subjective:     CC: F/u    HPI:   Alexsandra presents today with     Problem   Bug Bites    Patient suffering from itchiness and pain all over her skin from mosquito bites. Calamine, OTC Hydrocortisone cream and bug spray ineffective. Requesting something stronger.     Memory Problem    Patient and her  concerned about deterioration in memory. Difficulty with word retrieval and recalling train of thinking. No known dementia in family.     Type 2 Diabetes Mellitus Without Complication, Without Long-Term Current Use of Insulin (Hcc)    Patient had been taking Farxiga for 2 months, but did not like the way it made her sugars labile. AM fasting glucose 125-153, with occasional anomalies to 230s. Wants to discontinue medication altogether. Has lost 13 lbs in 2 months.     Tinnitus    Patient noting more tinnitus recently in left ear worse than right. Started 2 months ago. No hearing loss or ear pain associated with this.     Hypothyroidism (Acquired)    Patient recently lost 13 lbs. Currently taking 88mcg. Did not receive my message in early May about changing her levothyroxine dose. Feeling more irritable, less hungry, and sleeping poorly.     Essential Hypertension    BP: 110-137/67-84  No headaches, palpitations, or shortness of breath         Current Outpatient Medications Ordered in Epic   Medication Sig Dispense Refill    triamcinolone acetonide (KENALOG) 0.1 % Cream Apply 1 Application topically 2 times a day. 45 g 3    triamterene/hctz (MAXZIDE-25/DYAZIDE) 37.5-25 MG Cap Take 1 Capsule by mouth every morning. 90 Capsule 3    ascorbic acid (VITAMIN C) 100 MG tablet Take 10 Tablets by mouth every day. 90 Tablet 3    levothyroxine (SYNTHROID) 75 MCG Tab Take 1 Tablet by mouth every morning on an empty stomach. 90 Tablet 3    atorvastatin (LIPITOR) 10 MG Tab Take 1 Tablet by mouth every day. 90 Tablet 3    losartan (COZAAR) 25 MG Tab Take 0.5 Tablets by mouth every day. 45 Tablet 3    cholecalciferol (VITAMIN D3)  "5000 UNIT Cap Take 5,000 Units by mouth every day.      B Complex-C (SUPER B COMPLEX PO) Take 1 Tab by mouth every day.      multivitamin (THERAGRAN) Tab Take 1 Tab by mouth every day.       No current [facility]-ordered facility-administered medications on file.       Objective:     Exam:  BP (P) 111/72 (BP Location: Left arm, Patient Position: Sitting, BP Cuff Size: Small adult)   Pulse (P) 73   Resp 16   Ht 1.6 m (5' 3\")   Wt 54 kg (119 lb)   SpO2 (P) 98%   BMI 21.08 kg/m²  Body mass index is 21.08 kg/m².    General: Normal appearing. No distress.  Neck: Supple without JVD or bruit. Thyroid is not enlarged.  Pulmonary: Clear to ausculation.  Normal effort. No rales, ronchi, or wheezing.  Cardiovascular: Regular rate and rhythm without murmur.   Lymph: No cervical or supraclavicular lymph nodes are palpable  Skin: Warm and dry.  Some excoriation marks on arms, lesions appearing like bug bites.  Musculoskeletal: Normal gait. No extremity cyanosis, clubbing, or edema.  Psych: Normal mood and affect. Alert and oriented x3. Judgment and insight is normal.      Assessment & Plan:     78 y.o. female with the following -     Problem List Items Addressed This Visit       Essential hypertension     Well controlled at home and in clinic. No hypotensive episodes. Continue triamterene/HCTZ and losartan.         Hypothyroidism (acquired)     TSH low at 0.1. No T4 resulted. Symptomatic for hyperthyroidism, causing weight loss. Patient should have decreased dose to 75mcg in early May when I notified her of lab abnormalities, but she did not see message. Decrease dose to 75mcg and recheck TSH + Free T4 in 6-8 weeks.         Relevant Orders    TSH+FREE T4    Tinnitus     Possibly related to poorly controlled hypothyroidism. Ear exam normal bilaterally. Correct thyroid then revisit.         Type 2 diabetes mellitus without complication, without long-term current use of insulin (HCC)     Recent A1c two months ago of 7.6, at goal of " 7-8 for patient's age. Okay with discontinuing farxiga, per patient preference. She has aversion to metformin. Wants to stick to diet control. F/u in 2 months and recheck A1c. If increased, consider januvia. In meantime, recommend discontinuation of checking morning AM fasting glucose. No hypoglycemia noted, which would be more concerning.         Bug bites     Some open scabs on arms from excoriation from mosquito bites. Rx sent in for medium potency steroid cream.         Memory problem     Likely age related, mild. Taking prevagen, which I explained has little effect. Recommend lifestyle treatments: sleep hygiene, exercise, good nutrition, and word/number puzzles, as well as good control of chronic diseases.          Other Visit Diagnoses       Skin rash        Relevant Medications    triamcinolone acetonide (KENALOG) 0.1 % Cream              No follow-ups on file.    Elly Rodríguez MD   PGY-3

## 2023-06-20 PROBLEM — R41.3 MEMORY PROBLEM: Status: ACTIVE | Noted: 2023-06-20
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
## 2023-06-20 NOTE — ASSESSMENT & PLAN NOTE
Possibly related to poorly controlled hypothyroidism. Ear exam normal bilaterally. Correct thyroid then revisit.

## 2023-06-20 NOTE — ASSESSMENT & PLAN NOTE
TSH low at 0.1. No T4 resulted. Symptomatic for hyperthyroidism, causing weight loss. Patient should have decreased dose to 75mcg in early May when I notified her of lab abnormalities, but she did not see message. Decrease dose to 75mcg and recheck TSH + Free T4 in 6-8 weeks.

## 2023-06-20 NOTE — ASSESSMENT & PLAN NOTE
Likely age related, mild. Taking prevagen, which I explained has little effect. Recommend lifestyle treatments: sleep hygiene, exercise, good nutrition, and word/number puzzles, as well as good control of chronic diseases.

## 2023-06-20 NOTE — ASSESSMENT & PLAN NOTE
Well controlled at home and in clinic. No hypotensive episodes. Continue triamterene/HCTZ and losartan.

## 2023-06-20 NOTE — ASSESSMENT & PLAN NOTE
Some open scabs on arms from excoriation from mosquito bites. Rx sent in for medium potency steroid cream.

## 2023-06-20 NOTE — ASSESSMENT & PLAN NOTE
Recent A1c two months ago of 7.6, at goal of 7-8 for patient's age. Okay with discontinuing farxiga, per patient preference. She has aversion to metformin. Wants to stick to diet control. F/u in 2 months and recheck A1c. If increased, consider januvia. In meantime, recommend discontinuation of checking morning AM fasting glucose. No hypoglycemia noted, which would be more concerning.

## 2023-06-21 DIAGNOSIS — E03.9 HYPOTHYROIDISM (ACQUIRED): ICD-10-CM

## 2023-06-21 RX ORDER — LEVOTHYROXINE SODIUM 0.07 MG/1
75 TABLET ORAL
Qty: 90 TABLET | Refills: 3 | Status: SHIPPED | OUTPATIENT
Start: 2023-06-21 | End: 2023-07-25 | Stop reason: SDUPTHER

## 2023-07-25 DIAGNOSIS — E03.9 HYPOTHYROIDISM (ACQUIRED): ICD-10-CM

## 2023-07-25 DIAGNOSIS — I10 ESSENTIAL HYPERTENSION: ICD-10-CM

## 2023-07-25 DIAGNOSIS — E78.2 MIXED HYPERLIPIDEMIA: ICD-10-CM

## 2023-07-25 RX ORDER — LEVOTHYROXINE SODIUM 0.07 MG/1
75 TABLET ORAL
Qty: 90 TABLET | Refills: 3 | Status: SHIPPED | OUTPATIENT
Start: 2023-07-25 | End: 2023-09-27

## 2023-07-25 RX ORDER — TRIAMTERENE AND HYDROCHLOROTHIAZIDE 37.5; 25 MG/1; MG/1
1 CAPSULE ORAL EVERY MORNING
Qty: 90 CAPSULE | Refills: 3 | Status: SHIPPED | OUTPATIENT
Start: 2023-07-25

## 2023-07-25 RX ORDER — ATORVASTATIN CALCIUM 10 MG/1
10 TABLET, FILM COATED ORAL DAILY
Qty: 90 TABLET | Refills: 3 | Status: SHIPPED | OUTPATIENT
Start: 2023-07-25

## 2023-07-25 NOTE — TELEPHONE ENCOUNTER
Received request via: Pharmacy    Was the patient seen in the last year in this department? Yes    Does the patient have an active prescription (recently filled or refills available) for medication(s) requested? No    Does the patient have [facility] and need 100 day supply (blood pressure, diabetes and cholesterol meds only)? Patient does not have SCP    Tried running a refill, but Dr. Rodríguez statte license number didn't go through.

## 2023-09-27 ENCOUNTER — OFFICE VISIT (OUTPATIENT)
Dept: MEDICAL GROUP | Facility: CLINIC | Age: 78
End: 2023-09-27
Payer: MEDICARE

## 2023-09-27 VITALS
DIASTOLIC BLOOD PRESSURE: 74 MMHG | BODY MASS INDEX: 22.08 KG/M2 | TEMPERATURE: 98 F | HEIGHT: 62 IN | SYSTOLIC BLOOD PRESSURE: 116 MMHG | WEIGHT: 120 LBS | OXYGEN SATURATION: 94 % | HEART RATE: 66 BPM

## 2023-09-27 DIAGNOSIS — E11.9 TYPE 2 DIABETES MELLITUS WITHOUT COMPLICATION, WITHOUT LONG-TERM CURRENT USE OF INSULIN (HCC): ICD-10-CM

## 2023-09-27 DIAGNOSIS — E03.9 HYPOTHYROIDISM (ACQUIRED): ICD-10-CM

## 2023-09-27 DIAGNOSIS — M25.532 LEFT WRIST PAIN: ICD-10-CM

## 2023-09-27 LAB
HBA1C MFR BLD: 7.5 % (ref ?–5.8)
POCT INT CON NEG: NEGATIVE
POCT INT CON POS: POSITIVE

## 2023-09-27 PROCEDURE — 99213 OFFICE O/P EST LOW 20 MIN: CPT | Mod: GE

## 2023-09-27 PROCEDURE — 3078F DIAST BP <80 MM HG: CPT

## 2023-09-27 PROCEDURE — 83036 HEMOGLOBIN GLYCOSYLATED A1C: CPT

## 2023-09-27 PROCEDURE — 92250 FUNDUS PHOTOGRAPHY W/I&R: CPT | Mod: TC

## 2023-09-27 PROCEDURE — 3074F SYST BP LT 130 MM HG: CPT

## 2023-09-27 RX ORDER — LEVOTHYROXINE SODIUM 0.07 MG/1
75 TABLET ORAL
Qty: 90 TABLET | Refills: 3 | Status: SHIPPED | OUTPATIENT
Start: 2023-09-27 | End: 2023-12-20

## 2023-09-27 ASSESSMENT — FIBROSIS 4 INDEX: FIB4 SCORE: 2.85

## 2023-09-27 NOTE — ASSESSMENT & PLAN NOTE
Patient has tenderness along thumb and radial head after gardening this summer. Brace is helping with the pain for now. She is agreeable to trying physical therapy. Do not believe that this is a fracture due to no trauma and gradual onset of the symptoms.  - PT referral   - Continue brace for symptom relief

## 2023-09-27 NOTE — ASSESSMENT & PLAN NOTE
TSH 0.032 and Free T4 of 2.14. Patient has been taking 75 mcg daily. Her weight has been stable since last appointment but patient feels her clothes are not fitting as well as they used to.  - Levothyroxine 75mcg Monday-Saturday every morning on an empty stomach, not to take on Sunday morning.   - Follow-up in 3 months

## 2023-09-27 NOTE — ASSESSMENT & PLAN NOTE
A1C 7.5 today. Patient preference is to stick with diet control at this time. She is checking her blood sugar weekly which she prefers.  - Diet control since patient is within goal of 7-8 for her age   - Diabetic monofilament test completed and normal  - Retinal screening completed   - Follow-up in 3 months for A1C

## 2023-09-27 NOTE — PROGRESS NOTES
Subjective:     CC: Re-establish care and follow-up on labs     HPI:   Alexsandra presents today with follow-up on labs.     Patient states that she has lost weight, but according to previous visit her weight has been stable. She denies any palpitations, nausea, irritability or restlessness. She reports that she has constipation, that resolves with stool softeners.      Patient is due for an A1C, she previously did not tolerate Farxiga. She requested her A1C to be done because she has been having increase urination but no polydipsia. She has been losing weight, but has not actively been trying to lose weight besides cutting out sugar from her diet. She checks her blood sugars once a week and is usually 100-140 fasting. Her A1C is 7.5. It was 7.6 5 months ago. Patient states that she is not interested in using medications for her diabetes and would like to continue with     Patient states that her left wrist has been sore since July. She had been pulling weeds and doing yard work when she began to notice the pain. She states that there was a gradual onset. It worsens with activities and improves with rest. She denies any trauma to her wrist. She states that trying to open bottles now causes pain. She has been using a brace for the last two weeks that helps with the pain.     Current Outpatient Medications Ordered in Epic   Medication Sig Dispense Refill    levothyroxine (SYNTHROID) 75 MCG Tab Take 1 Tablet by mouth every morning on an empty stomach. Take one tablet by mouth every morning Monday-Saturday. Do not take on Sunday 90 Tablet 3    triamterene/hctz (MAXZIDE-25/DYAZIDE) 37.5-25 MG Cap Take 1 Capsule by mouth every morning. 90 Capsule 3    atorvastatin (LIPITOR) 10 MG Tab Take 1 Tablet by mouth every day. 90 Tablet 3    ascorbic acid (VITAMIN C) 100 MG tablet Take 10 Tablets by mouth every day. 90 Tablet 3    losartan (COZAAR) 25 MG Tab Take 0.5 Tablets by mouth every day. 45 Tablet 3    cholecalciferol (VITAMIN D3)  5000 UNIT Cap Take 5,000 Units by mouth every day.      B Complex-C (SUPER B COMPLEX PO) Take 1 Tab by mouth every day.      multivitamin (THERAGRAN) Tab Take 1 Tab by mouth every day.       No current [facility]-ordered facility-administered medications on file.     Family History   Problem Relation Age of Onset    Stroke Mother 78    Cancer Mother         Cervical    Lung Disease Mother     Hypertension Father     Cancer Maternal Grandfather         Lung      Past Surgical History:   Procedure Laterality Date    CARPAL TUNNEL RELEASE Left 10/3/2017    Procedure: CARPAL TUNNEL RELEASE;  Surgeon: Sarita Madrid M.D.;  Location: SURGERY [facility];  Service: Orthopedics    CATARACT EXTRACTION WITH IOL Bilateral 7/2015    CARPAL TUNNEL RELEASE  2015    LAMINOTOMY  11/18/2009    LEFT Performed by SARITA BURKS at SURGERY [facility]    ROTATOR CUFF REPAIR  2/5/2009    Performed by OSGOOD, PATRICK J at SURGERY [facility]    SHOULDER DECOMPRESSION  2/5/2009    Performed by OSGOOD, PATRICK J at SURGERY [facility]    ROTATOR CUFF REPAIR Right 2009    KNEE ARTHROSCOPY Right 2005    BREAST RECONSTRUCTION  6/2004    MASTECTOMY Bilateral 2003     excision lymph nodes right axillary    CATH, PORT-A-CATH  2003    BREAST IMPLANT REMOVAL      expanders removed    CYST EXCISION      LYMPH NODE EXCISION Right     arm    OTHER SURGICAL PROCEDURE      9/2003 port placed, 3/2004 port removed      Social History     Tobacco Use    Smoking status: Never    Smokeless tobacco: Never   Vaping Use    Vaping Use: Never used   Substance Use Topics    Alcohol use: No     Alcohol/week: 1.0 oz     Types: 2 Glasses of wine per week     Comment: Rare    Drug use: No        ROS:  Gen: no fevers/chills, no changes in weight  Pulm: no sob, no cough  CV: no chest pain, no palpitations  GI: no nausea/vomiting, no diarrhea  Ext: Tenderness along radial head and thumb, tenderness with pronation and radial deviation     Objective:  "    Exam:  /74   Pulse 66   Temp 36.7 °C (98 °F)   Ht 1.575 m (5' 2\")   Wt 54.4 kg (120 lb)   SpO2 94%   BMI 21.95 kg/m²  Body mass index is 21.95 kg/m².    Gen: Alert and oriented, No apparent distress.  Neck: Neck is supple without lymphadenopathy.  Lungs: Normal effort, CTA bilaterally, no wheezes, rhonchi, or rales  CV: Regular rate and rhythm. No murmurs, rubs, or gallops.  Ext: No clubbing, cyanosis, edema.    Labs: TSH 0.032 and Free T4 was 2.14     Assessment & Plan:     78 y.o. female with the following -     There are no diagnoses linked to this encounter.   Hypothyroidism (acquired)  TSH 0.032 and Free T4 of 2.14. Patient has been taking 75 mcg daily. Her weight has been stable since last appointment but patient feels her clothes are not fitting as well as they used to.  - Levothyroxine 75mcg Monday-Saturday every morning on an empty stomach, not to take on Sunday morning.   - Follow-up in 3 months     Type 2 diabetes mellitus without complication, without long-term current use of insulin (HCC)  A1C 7.5 today. Patient preference is to stick with diet control at this time. She is checking her blood sugar weekly which she prefers.  - Diet control since patient is within goal of 7-8 for her age   - Diabetic monofilament test completed and normal  - Retinal screening completed   - Follow-up in 3 months for A1C    Left wrist pain  Patient has tenderness along thumb and radial head after gardening this summer. Brace is helping with the pain for now. She is agreeable to trying physical therapy. Do not believe that this is a fracture due to no trauma and gradual onset of the symptoms.  - PT referral   - Continue brace for symptom relief       Return in about 3 months (around 12/27/2023).    Kari Wiley M.D.  PGY1          "

## 2023-09-28 LAB — RETINAL SCREEN: NEGATIVE

## 2023-10-24 ENCOUNTER — TELEPHONE (OUTPATIENT)
Dept: MEDICAL GROUP | Facility: CLINIC | Age: 78
End: 2023-10-24
Payer: MEDICARE

## 2023-10-24 NOTE — TELEPHONE ENCOUNTER
----- Message from Kari Wiley M.D. sent at 10/23/2023  4:03 PM PDT -----  Regarding: Physical Therapy  Can you please let this patient know that her referral for PT was approved for   [facility]  [id number]  [place] [id number]  Phone: [phone]    If she has a preferred PT in mind she will need to reach out to the PT and her insurance to determine if insurance will cover.

## 2023-10-24 NOTE — TELEPHONE ENCOUNTER
Phone Number Called: [phone]     Call outcome: Left detailed message for patient. Informed to call back with any additional questions.    Message: MD message given. Also if there's an issue with the referral to call our referral team at [phone].

## 2023-10-26 ENCOUNTER — TELEPHONE (OUTPATIENT)
Dept: MEDICAL GROUP | Facility: CLINIC | Age: 78
End: 2023-10-26
Payer: MEDICARE

## 2023-10-26 NOTE — TELEPHONE ENCOUNTER
Phone Number Called: [phone] (home) [phone] (work)      Call outcome: Spoke to patient regarding message below.    Message: Let her know about referral order, how to contact [facility] referrals so she can see PT she would like.     PT Also REQUESTING BONE SCAN    Pt concerned about if we will take [facility] [facility] PPO. As of the list from 9/7/2023 we should be taking it, but not sure

## 2023-11-15 ENCOUNTER — OFFICE VISIT (OUTPATIENT)
Dept: MEDICAL GROUP | Facility: CLINIC | Age: 78
End: 2023-11-15
Payer: MEDICARE

## 2023-11-15 VITALS
SYSTOLIC BLOOD PRESSURE: 123 MMHG | WEIGHT: 118 LBS | BODY MASS INDEX: 21.71 KG/M2 | DIASTOLIC BLOOD PRESSURE: 80 MMHG | TEMPERATURE: 98.6 F | HEIGHT: 62 IN | OXYGEN SATURATION: 94 % | HEART RATE: 72 BPM

## 2023-11-15 DIAGNOSIS — G47.9 SLEEP DISTURBANCE: ICD-10-CM

## 2023-11-15 DIAGNOSIS — E03.9 HYPOTHYROIDISM (ACQUIRED): ICD-10-CM

## 2023-11-15 DIAGNOSIS — M25.532 LEFT WRIST PAIN: ICD-10-CM

## 2023-11-15 PROCEDURE — 3079F DIAST BP 80-89 MM HG: CPT

## 2023-11-15 PROCEDURE — 99213 OFFICE O/P EST LOW 20 MIN: CPT | Mod: GE

## 2023-11-15 PROCEDURE — 3074F SYST BP LT 130 MM HG: CPT

## 2023-11-15 ASSESSMENT — FIBROSIS 4 INDEX: FIB4 SCORE: 2.85

## 2023-11-15 NOTE — PROGRESS NOTES
Subjective:     CC: Wrist pain and thyroid     HPI:   Alexsandra presents today with left wrist pain.     Her wrist has been sore since July 2023. She denies any trauma to her wrist. She has pain with palpation to the medial aspect to her hand. She is able to make a fist and pick items up. She has trouble with writing as she is left handed. She cannot do any twisting motion. Physical therapy starts tomorrow. She has been to the chiropractor which did not help. She is still wearing the brace at night.     She also reports trouble sleeping. She has been taking melatonin every night. She goes to bed at 10:30 PM and wakes at 3:00 am to take her levothyroxine and go to the bathroom. She has been taking the levothyroxine Monday-Saturday. Patient reports having to get up to multiple times in the middle the night to go to the bathroom she states that she drinks lots of fluids before going to bed.  Does report that she is able from 3 AM to 5 AM fall back asleep however she tosses and turns from 5 AM until 8 AM.    Current Outpatient Medications Ordered in Epic   Medication Sig Dispense Refill    levothyroxine (SYNTHROID) 75 MCG Tab Take 1 Tablet by mouth every morning on an empty stomach. Take one tablet by mouth every morning Monday-Saturday. Do not take on Sunday 90 Tablet 3    triamterene/hctz (MAXZIDE-25/DYAZIDE) 37.5-25 MG Cap Take 1 Capsule by mouth every morning. 90 Capsule 3    atorvastatin (LIPITOR) 10 MG Tab Take 1 Tablet by mouth every day. 90 Tablet 3    ascorbic acid (VITAMIN C) 100 MG tablet Take 10 Tablets by mouth every day. 90 Tablet 3    losartan (COZAAR) 25 MG Tab Take 0.5 Tablets by mouth every day. 45 Tablet 3    cholecalciferol (VITAMIN D3) 5000 UNIT Cap Take 5,000 Units by mouth every day.      B Complex-C (SUPER B COMPLEX PO) Take 1 Tab by mouth every day.      multivitamin (THERAGRAN) Tab Take 1 Tab by mouth every day.       No current [facility]-ordered facility-administered medications on file.     Family  "History   Problem Relation Age of Onset    Stroke Mother 78    Cancer Mother         Cervical    Lung Disease Mother     Hypertension Father     Cancer Maternal Grandfather         Lung      Past Surgical History:   Procedure Laterality Date    CARPAL TUNNEL RELEASE Left 10/3/2017    Procedure: CARPAL TUNNEL RELEASE;  Surgeon: Sarita Madrid M.D.;  Location: SURGERY [facility];  Service: Orthopedics    CATARACT EXTRACTION WITH IOL Bilateral 7/2015    CARPAL TUNNEL RELEASE  2015    LAMINOTOMY  11/18/2009    LEFT Performed by SARTIA BURKS at SURGERY [facility]    ROTATOR CUFF REPAIR  2/5/2009    Performed by OSGOOD, PATRICK J at SURGERY [facility]    SHOULDER DECOMPRESSION  2/5/2009    Performed by OSGOOD, PATRICK J at [facility]    ROTATOR CUFF REPAIR Right 2009    KNEE ARTHROSCOPY Right 2005    BREAST RECONSTRUCTION  6/2004    MASTECTOMY Bilateral 2003     excision lymph nodes right axillary    CATH, PORT-A-CATH  2003    BREAST IMPLANT REMOVAL      expanders removed    CYST EXCISION      LYMPH NODE EXCISION Right     arm    OTHER SURGICAL PROCEDURE      9/2003 port placed, 3/2004 port removed      Social History     Tobacco Use    Smoking status: Never    Smokeless tobacco: Never   Vaping Use    Vaping Use: Never used   Substance Use Topics    Alcohol use: No     Alcohol/week: 1.0 oz     Types: 2 Glasses of wine per week     Comment: Rare    Drug use: No        ROS:  Gen: no fevers/chills, no changes in weight  Pulm: no sob, no cough  CV: no chest pain, no palpitations  GI: no nausea/vomiting, no diarrhea    Objective:     Exam:  /80 (BP Location: Left arm, Patient Position: Sitting, BP Cuff Size: Adult)   Pulse 72   Temp 37 °C (98.6 °F) (Temporal)   Ht 1.575 m (5' 2\")   Wt 53.5 kg (118 lb)   SpO2 94%   BMI 21.58 kg/m²  Body mass index is 21.58 kg/m².    Gen: Alert and oriented, No apparent distress.  Neck: Neck is supple without lymphadenopathy.  Lungs: Normal " effort, CTA bilaterally, no wheezes, rhonchi, or rales  CV: Regular rate and rhythm. No murmurs, rubs, or gallops.  Ext: No clubbing, cyanosis, edema. Tenderness distal to left radial head, tenderness with supination, tenderness with ulnar and radial deviation, no bony tenderness     Labs: none     Assessment & Plan:     78 y.o. female with the following -     There are no diagnoses linked to this encounter.   Hypothyroidism (acquired)  Last TSH was preformed in August. In September decreased levothyroxine to 75 mcg for 6 out of 7 days.     Plan:  - TSH with T4 reflex   - Follow-up in clinic for further medication adjustments if needed     Left wrist pain  Patient continues to have tenderness along the radial side of her wrist. No swelling or trauma has not been to PT yet.     Plan:  -PT referral encourage patient to go to her appointment tomorrow  - Continue brace for symptom relief    Sleep disturbance  Patient reports sleep disturbance, of waking up to go to the bathroom multiple times at night.  This has been a problem for several months if not longer.  It has been taking her medication levothyroxine at 3 AM daily.    Plan:  -Discussed taking levothyroxine at 7 AM instead of 3 AM to not disrupt her sleep cycle  - Discussed limiting water an hour before bed to prevent the need to go to the bathroom  - Discussed going to the bathroom prior to getting into bed to go to sleep to empty the bladder  - Continue taking melatonin, discussed not going above 10 mg nightly for her dose  - Discussed sleep hygiene as well       No follow-ups on file.    Kari Wiley M.D.  PGY1

## 2023-11-16 NOTE — ASSESSMENT & PLAN NOTE
Patient continues to have tenderness along the radial side of her wrist. No swelling or trauma has not been to PT yet.     Plan:  -PT referral encourage patient to go to her appointment tomorrow  - Continue brace for symptom relief

## 2023-11-16 NOTE — ASSESSMENT & PLAN NOTE
Last TSH was preformed in August. In September decreased levothyroxine to 75 mcg for 6 out of 7 days.     Plan:  - TSH with T4 reflex   - Follow-up in clinic for further medication adjustments if needed

## 2023-11-16 NOTE — ASSESSMENT & PLAN NOTE
Patient reports sleep disturbance, of waking up to go to the bathroom multiple times at night.  This has been a problem for several months if not longer.  It has been taking her medication levothyroxine at 3 AM daily.    Plan:  -Discussed taking levothyroxine at 7 AM instead of 3 AM to not disrupt her sleep cycle  - Discussed limiting water an hour before bed to prevent the need to go to the bathroom  - Discussed going to the bathroom prior to getting into bed to go to sleep to empty the bladder  - Continue taking melatonin, discussed not going above 10 mg nightly for her dose  - Discussed sleep hygiene as well

## 2023-12-20 ENCOUNTER — OFFICE VISIT (OUTPATIENT)
Dept: MEDICAL GROUP | Facility: CLINIC | Age: 78
End: 2023-12-20
Payer: MEDICARE

## 2023-12-20 VITALS
SYSTOLIC BLOOD PRESSURE: 128 MMHG | OXYGEN SATURATION: 95 % | HEART RATE: 64 BPM | HEIGHT: 62 IN | WEIGHT: 120 LBS | RESPIRATION RATE: 16 BRPM | DIASTOLIC BLOOD PRESSURE: 82 MMHG | BODY MASS INDEX: 22.08 KG/M2

## 2023-12-20 DIAGNOSIS — E03.9 HYPOTHYROIDISM (ACQUIRED): ICD-10-CM

## 2023-12-20 DIAGNOSIS — H93.13 TINNITUS OF BOTH EARS: ICD-10-CM

## 2023-12-20 PROCEDURE — 99213 OFFICE O/P EST LOW 20 MIN: CPT | Mod: GC

## 2023-12-20 PROCEDURE — 3074F SYST BP LT 130 MM HG: CPT

## 2023-12-20 PROCEDURE — 3079F DIAST BP 80-89 MM HG: CPT

## 2023-12-20 RX ORDER — LEVOTHYROXINE SODIUM 0.05 MG/1
50 TABLET ORAL
Qty: 90 TABLET | Refills: 3 | Status: SHIPPED | OUTPATIENT
Start: 2023-12-20

## 2023-12-20 ASSESSMENT — FIBROSIS 4 INDEX: FIB4 SCORE: 2.85

## 2023-12-21 NOTE — ASSESSMENT & PLAN NOTE
Patient with tinnitus present for almost a year, bilaterally. No hearing loss or ear pain. Has normal ear exam.     Plan  Referral to ENT

## 2023-12-21 NOTE — PROGRESS NOTES
Subjective:     CC: TSH labs     HPI:   Alexsandra presents today for follow-up on her TSH labs.     She is not having any palpitations, excess sweat, headaches, no tremors, or weight loss.     She has been taking 75 mcg of levothyroxine Monday- Saturday and has been skipping her Sunday dose.  in the room reports that he has to remind her every Sunday to skip her dose and has set an alarm. She has been doing well with this, however they both believe that taking it daily would be easier.     Patient has ear rinning that has been going for over a year. It is bilateral. She is having dizziness occasionally often associated with standing up fast or sitting up fast. However if she takes her time to sit up or stand off she does not have these symptoms.     Current Outpatient Medications Ordered in Epic   Medication Sig Dispense Refill    levothyroxine (SYNTHROID) 50 MCG Tab Take 1 Tablet by mouth every morning on an empty stomach. 90 Tablet 3    triamterene/hctz (MAXZIDE-25/DYAZIDE) 37.5-25 MG Cap Take 1 Capsule by mouth every morning. 90 Capsule 3    atorvastatin (LIPITOR) 10 MG Tab Take 1 Tablet by mouth every day. 90 Tablet 3    ascorbic acid (VITAMIN C) 100 MG tablet Take 10 Tablets by mouth every day. 90 Tablet 3    losartan (COZAAR) 25 MG Tab Take 0.5 Tablets by mouth every day. 45 Tablet 3    cholecalciferol (VITAMIN D3) 5000 UNIT Cap Take 5,000 Units by mouth every day.      B Complex-C (SUPER B COMPLEX PO) Take 1 Tab by mouth every day.      multivitamin (THERAGRAN) Tab Take 1 Tab by mouth every day.       No current [facility]-ordered facility-administered medications on file.     Family History   Problem Relation Age of Onset    Stroke Mother 78    Cancer Mother         Cervical    Lung Disease Mother     Hypertension Father     Cancer Maternal Grandfather         Lung      Past Surgical History:   Procedure Laterality Date    CARPAL TUNNEL RELEASE Left 10/3/2017    Procedure: CARPAL TUNNEL RELEASE;  Surgeon: Josue  "ARUNA Madrid M.D.;  Location: SURGERY [facility];  Service: Orthopedics    CATARACT EXTRACTION WITH IOL Bilateral 7/2015    CARPAL TUNNEL RELEASE  2015    LAMINOTOMY  11/18/2009    LEFT Performed by SARITA BURKS at SURGERY [facility]    ROTATOR CUFF REPAIR  2/5/2009    Performed by OSGOOD, PATRICK J at SURGERY [facility]    SHOULDER DECOMPRESSION  2/5/2009    Performed by OSGOOD, PATRICK J at SURGERY [facility]    ROTATOR CUFF REPAIR Right 2009    KNEE ARTHROSCOPY Right 2005    BREAST RECONSTRUCTION  6/2004    MASTECTOMY Bilateral 2003     excision lymph nodes right axillary    CATH, PORT-A-CATH  2003    BREAST IMPLANT REMOVAL      expanders removed    CYST EXCISION      LYMPH NODE EXCISION Right     arm    OTHER SURGICAL PROCEDURE      9/2003 port placed, 3/2004 port removed      Social History     Tobacco Use    Smoking status: Never    Smokeless tobacco: Never   Vaping Use    Vaping Use: Never used   Substance Use Topics    Alcohol use: No     Alcohol/week: 1.0 oz     Types: 2 Glasses of wine per week     Comment: Rare    Drug use: No        ROS:  Gen: no fevers/chills, no changes in weight  Pulm: no sob, no cough  CV: no chest pain, no palpitations  GI: no nausea/vomiting, no diarrhea    Objective:     Exam:  /82 (BP Location: Left arm, Patient Position: Sitting, BP Cuff Size: Adult)   Pulse 64   Resp 16   Ht 1.575 m (5' 2\")   Wt 54.4 kg (120 lb)   SpO2 95%   BMI 21.95 kg/m²  Body mass index is 21.95 kg/m².    Gen: Alert and oriented, No apparent distress.  Neck: Neck is supple without lymphadenopathy.  Lungs: Normal effort, CTA bilaterally, no wheezes, rhonchi, or rales  CV: Regular rate and rhythm. No murmurs, rubs, or gallops.  Ext: No clubbing, cyanosis, edema.    Labs:   Results for orders placed or performed in visit on 12/08/23   TSH RFLX TO T4F   Result Value Ref Range    TSH 0.314 (L) 0.450 - 4.500 uIU/mL         Assessment & Plan:     78 y.o. female with the " following -       Hypothyroidism (acquired)  TSH was 0.314 on her labs 12/8. Per  and patient would benefit from taking a daily medication instead of skipping days.     Discontinue levothyroxine 75 mcg (taking 6 out of 7 days)  Start taking levothyroxine 50 mcg daily   TSH with T4 reflex lab ordered for in 6 weeks     Tinnitus  Patient with tinnitus present for almost a year, bilaterally. No hearing loss or ear pain. Has normal ear exam.     Plan  Referral to ENT        No follow-ups on file.    Kari Wiley M.D.  PGY1

## 2023-12-21 NOTE — ASSESSMENT & PLAN NOTE
TSH was 0.314 on her labs 12/8. Per  and patient would benefit from taking a daily medication instead of skipping days.     Discontinue levothyroxine 75 mcg (taking 6 out of 7 days)  Start taking levothyroxine 50 mcg daily   TSH with T4 reflex lab ordered for in 6 weeks

## 2023-12-27 ENCOUNTER — TELEPHONE (OUTPATIENT)
Dept: MEDICAL GROUP | Facility: CLINIC | Age: 78
End: 2023-12-27
Payer: MEDICARE

## 2023-12-27 ENCOUNTER — HOSPITAL ENCOUNTER (OUTPATIENT)
Dept: RADIOLOGY | Facility: MEDICAL CENTER | Age: 78
End: 2023-12-27
Payer: MEDICARE

## 2023-12-27 DIAGNOSIS — M25.532 LEFT WRIST PAIN: ICD-10-CM

## 2023-12-27 PROCEDURE — 73110 X-RAY EXAM OF WRIST: CPT | Mod: LT

## 2023-12-27 NOTE — TELEPHONE ENCOUNTER
Spouse of patient called requesting an xray of patients L Wrist. Her therapist is requesting this image due to the amount of px the patient is in. Thank you

## 2024-01-02 ENCOUNTER — APPOINTMENT (OUTPATIENT)
Dept: RADIOLOGY | Facility: MEDICAL CENTER | Age: 79
End: 2024-01-02
Payer: MEDICARE

## 2024-03-14 ENCOUNTER — APPOINTMENT (RX ONLY)
Dept: URBAN - METROPOLITAN AREA CLINIC 4 | Facility: CLINIC | Age: 79
Setting detail: DERMATOLOGY
End: 2024-03-14

## 2024-03-14 DIAGNOSIS — D18.0 HEMANGIOMA: ICD-10-CM

## 2024-03-14 DIAGNOSIS — L82.0 INFLAMED SEBORRHEIC KERATOSIS: ICD-10-CM

## 2024-03-14 DIAGNOSIS — L82.1 OTHER SEBORRHEIC KERATOSIS: ICD-10-CM

## 2024-03-14 DIAGNOSIS — L57.0 ACTINIC KERATOSIS: ICD-10-CM

## 2024-03-14 DIAGNOSIS — L81.4 OTHER MELANIN HYPERPIGMENTATION: ICD-10-CM

## 2024-03-14 DIAGNOSIS — D22 MELANOCYTIC NEVI: ICD-10-CM

## 2024-03-14 PROBLEM — D18.01 HEMANGIOMA OF SKIN AND SUBCUTANEOUS TISSUE: Status: ACTIVE | Noted: 2024-03-14

## 2024-03-14 PROBLEM — D22.61 MELANOCYTIC NEVI OF RIGHT UPPER LIMB, INCLUDING SHOULDER: Status: ACTIVE | Noted: 2024-03-14

## 2024-03-14 PROBLEM — D22.72 MELANOCYTIC NEVI OF LEFT LOWER LIMB, INCLUDING HIP: Status: ACTIVE | Noted: 2024-03-14

## 2024-03-14 PROBLEM — D22.62 MELANOCYTIC NEVI OF LEFT UPPER LIMB, INCLUDING SHOULDER: Status: ACTIVE | Noted: 2024-03-14

## 2024-03-14 PROBLEM — D22.71 MELANOCYTIC NEVI OF RIGHT LOWER LIMB, INCLUDING HIP: Status: ACTIVE | Noted: 2024-03-14

## 2024-03-14 PROBLEM — D22.5 MELANOCYTIC NEVI OF TRUNK: Status: ACTIVE | Noted: 2024-03-14

## 2024-03-14 PROCEDURE — 99213 OFFICE O/P EST LOW 20 MIN: CPT | Mod: 25

## 2024-03-14 PROCEDURE — 17003 DESTRUCT PREMALG LES 2-14: CPT | Mod: 59

## 2024-03-14 PROCEDURE — ? LIQUID NITROGEN

## 2024-03-14 PROCEDURE — ? COUNSELING

## 2024-03-14 PROCEDURE — 17110 DESTRUCTION B9 LES UP TO 14: CPT

## 2024-03-14 PROCEDURE — ? OBSERVATION AND MEASURE

## 2024-03-14 PROCEDURE — 17000 DESTRUCT PREMALG LESION: CPT | Mod: 59

## 2024-03-14 ASSESSMENT — LOCATION DETAILED DESCRIPTION DERM
LOCATION DETAILED: RIGHT POPLITEAL SKIN
LOCATION DETAILED: RIGHT PROXIMAL PRETIBIAL REGION
LOCATION DETAILED: LEFT VENTRAL LATERAL PROXIMAL FOREARM
LOCATION DETAILED: RIGHT INFERIOR CENTRAL MALAR CHEEK
LOCATION DETAILED: LEFT INFERIOR MEDIAL UPPER BACK
LOCATION DETAILED: RIGHT ANTERIOR PROXIMAL UPPER ARM
LOCATION DETAILED: LEFT VENTRAL PROXIMAL FOREARM
LOCATION DETAILED: LEFT POPLITEAL SKIN
LOCATION DETAILED: LEFT LATERAL PROXIMAL PRETIBIAL REGION
LOCATION DETAILED: SUBXIPHOID
LOCATION DETAILED: RIGHT ANTERIOR DISTAL UPPER ARM
LOCATION DETAILED: LEFT SUPERIOR MEDIAL MIDBACK
LOCATION DETAILED: RIGHT MEDIAL SUPERIOR CHEST
LOCATION DETAILED: LEFT INFERIOR LATERAL MIDBACK
LOCATION DETAILED: LEFT ANTERIOR PROXIMAL UPPER ARM
LOCATION DETAILED: RIGHT VENTRAL PROXIMAL FOREARM
LOCATION DETAILED: LEFT CENTRAL MALAR CHEEK
LOCATION DETAILED: LEFT PROXIMAL PRETIBIAL REGION
LOCATION DETAILED: XIPHOID
LOCATION DETAILED: LEFT DISTAL POSTERIOR THIGH
LOCATION DETAILED: RIGHT ANTECUBITAL SKIN
LOCATION DETAILED: INFERIOR THORACIC SPINE
LOCATION DETAILED: LEFT INFERIOR MEDIAL MALAR CHEEK
LOCATION DETAILED: RIGHT DISTAL POSTERIOR THIGH
LOCATION DETAILED: LEFT ULNAR DORSAL HAND
LOCATION DETAILED: MIDDLE STERNUM
LOCATION DETAILED: LEFT ANTERIOR DISTAL UPPER ARM

## 2024-03-14 ASSESSMENT — LOCATION SIMPLE DESCRIPTION DERM
LOCATION SIMPLE: CHEST
LOCATION SIMPLE: RIGHT POSTERIOR THIGH
LOCATION SIMPLE: RIGHT FOREARM
LOCATION SIMPLE: RIGHT POPLITEAL SKIN
LOCATION SIMPLE: LEFT LOWER BACK
LOCATION SIMPLE: LEFT POPLITEAL SKIN
LOCATION SIMPLE: LEFT UPPER ARM
LOCATION SIMPLE: LEFT HAND
LOCATION SIMPLE: LEFT POSTERIOR THIGH
LOCATION SIMPLE: LEFT FOREARM
LOCATION SIMPLE: LEFT PRETIBIAL REGION
LOCATION SIMPLE: UPPER BACK
LOCATION SIMPLE: LEFT UPPER BACK
LOCATION SIMPLE: RIGHT UPPER ARM
LOCATION SIMPLE: RIGHT CHEEK
LOCATION SIMPLE: RIGHT PRETIBIAL REGION
LOCATION SIMPLE: ABDOMEN
LOCATION SIMPLE: LEFT CHEEK

## 2024-03-14 ASSESSMENT — LOCATION ZONE DERM
LOCATION ZONE: HAND
LOCATION ZONE: TRUNK
LOCATION ZONE: FACE
LOCATION ZONE: LEG
LOCATION ZONE: ARM

## 2024-03-14 NOTE — PROCEDURE: LIQUID NITROGEN
Application Tool (Optional): Liquid Nitrogen Sprayer
Render Note In Bullet Format When Appropriate: No
Aperture Size (Optional): C
Duration Of Freeze Thaw-Cycle (Seconds): 2
Show Applicator Variable?: Yes
Post-Care Instructions: I reviewed with the patient in detail post-care instructions. Patient is to wear sunprotection, and avoid picking at any of the treated lesions. Pt may apply Vaseline to crusted or scabbing areas.
Number Of Freeze-Thaw Cycles: 2 freeze-thaw cycles
Detail Level: Simple
Consent: The patient's consent was obtained including but not limited to risks of crusting, scabbing, blistering, scarring, darker or lighter pigmentary change, recurrence, incomplete removal and infection.
Medical Necessity Clause: This procedure was medically necessary because the lesions that were treated were: inflamed and itchy and very bothersome
Detail Level: Detailed
Medical Necessity Information: It is in your best interest to select a reason for this procedure from the list below. All of these items fulfill various CMS LCD requirements except the new and changing color options.
Spray Paint Text: The liquid nitrogen was applied to the skin utilizing a spray paint frosting technique.

## (undated) DEVICE — SENSOR SPO2 NEO LNCS ADHESIVE (20/BX) SEE USER NOTES

## (undated) DEVICE — TRAY SRGPRP PVP IOD WT PRP - (20/CA)

## (undated) DEVICE — PACK UPPER EXTREMITY SM OR - (3/CA)

## (undated) DEVICE — ELECTRODE DUAL RETURN W/ CORD - (50/PK)

## (undated) DEVICE — WATER IRRIGATION STERILE 1000ML (12EA/CA)

## (undated) DEVICE — GLOVE, LITE (PAIR)

## (undated) DEVICE — GLOVE BIOGEL SZ 8 SURGICAL PF LTX - (50PR/BX 4BX/CA)

## (undated) DEVICE — MASK ANESTHESIA ADULT  - (100/CA)

## (undated) DEVICE — SUCTION INSTRUMENT YANKAUER BULBOUS TIP W/O VENT (50EA/CA)

## (undated) DEVICE — BAG, SPONGE COUNT 50600

## (undated) DEVICE — STOCKINET BIAS 4 IN STERILE - (20/CA)

## (undated) DEVICE — PROTECTOR ULNA NERVE - (36PR/CA)

## (undated) DEVICE — GLOVE BIOGEL SZ 7 SURGICAL PF LTX - (50PR/BX 4BX/CA)

## (undated) DEVICE — MASK AIRWAY SIZE 3 UNIQUE SILICON (10/BX)

## (undated) DEVICE — MASK, LARYNGEAL AIRWAY #4

## (undated) DEVICE — HEAD HOLDER JUNIOR/ADULT

## (undated) DEVICE — SUTURE GENERAL

## (undated) DEVICE — ELECTRODE 850 FOAM ADHESIVE - HYDROGEL RADIOTRNSPRNT (50/PK)

## (undated) DEVICE — SUTURE 4-0 ETHILON FS-2 18 (36PK/BX)"

## (undated) DEVICE — TOURNIQUET CUFF 18 X 3 ONE PORT DISP - STERILE (10/BX)

## (undated) DEVICE — LACTATED RINGERS INJ 1000 ML - (14EA/CA 60CA/PF)

## (undated) DEVICE — KIT ANESTHESIA W/CIRCUIT & 3/LT BAG W/FILTER (20EA/CA)

## (undated) DEVICE — KIT ROOM DECONTAMINATION

## (undated) DEVICE — CANISTER SUCTION RIGID RED 1500CC (40EA/CA)

## (undated) DEVICE — CHLORAPREP 26 ML APPLICATOR - ORANGE TINT(25/CA)

## (undated) DEVICE — SODIUM CHL IRRIGATION 0.9% 1000ML (12EA/CA)

## (undated) DEVICE — GLOVE BIOGEL SZ 7.5 SURGICAL PF LTX - (50PR/BX 4BX/CA)

## (undated) DEVICE — DRESSING XEROFORM 1X8 - (50/BX 4BX/CA)

## (undated) DEVICE — SPONGE GAUZE STER 4X4 8-PL - (2/PK 50PK/BX 12BX/CS)

## (undated) DEVICE — HUMID-VENT HEAT AND MOISTURE EXCHANGE- (50/BX)

## (undated) DEVICE — GOWN WARMING STANDARD FLEX - (30/CA)

## (undated) DEVICE — NEPTUNE 4 PORT MANIFOLD - (20/PK)

## (undated) DEVICE — TUBE CONNECTING SUCTION - CLEAR PLASTIC STERILE 72 IN (50EA/CA)